# Patient Record
Sex: MALE | Race: ASIAN | NOT HISPANIC OR LATINO
[De-identification: names, ages, dates, MRNs, and addresses within clinical notes are randomized per-mention and may not be internally consistent; named-entity substitution may affect disease eponyms.]

---

## 2017-02-01 ENCOUNTER — APPOINTMENT (OUTPATIENT)
Dept: INTERNAL MEDICINE | Facility: HOSPITAL | Age: 62
End: 2017-02-01

## 2017-02-01 ENCOUNTER — MED ADMIN CHARGE (OUTPATIENT)
Age: 62
End: 2017-02-01

## 2017-02-01 ENCOUNTER — OUTPATIENT (OUTPATIENT)
Dept: OUTPATIENT SERVICES | Facility: HOSPITAL | Age: 62
LOS: 1 days | End: 2017-02-01

## 2017-02-01 VITALS — BODY MASS INDEX: 27.94 KG/M2 | WEIGHT: 178 LBS | HEIGHT: 67 IN

## 2017-02-01 VITALS — DIASTOLIC BLOOD PRESSURE: 69 MMHG | SYSTOLIC BLOOD PRESSURE: 108 MMHG | HEART RATE: 59 BPM

## 2017-02-01 DIAGNOSIS — Z87.898 PERSONAL HISTORY OF OTHER SPECIFIED CONDITIONS: ICD-10-CM

## 2017-02-01 DIAGNOSIS — R20.2 PARESTHESIA OF SKIN: ICD-10-CM

## 2017-02-01 DIAGNOSIS — B36.0 PITYRIASIS VERSICOLOR: ICD-10-CM

## 2017-02-01 DIAGNOSIS — N64.4 MASTODYNIA: ICD-10-CM

## 2017-02-01 DIAGNOSIS — M54.2 CERVICALGIA: ICD-10-CM

## 2017-02-01 DIAGNOSIS — Z95.1 PRESENCE OF AORTOCORONARY BYPASS GRAFT: Chronic | ICD-10-CM

## 2017-02-01 DIAGNOSIS — R21 RASH AND OTHER NONSPECIFIC SKIN ERUPTION: ICD-10-CM

## 2017-02-14 DIAGNOSIS — Z23 ENCOUNTER FOR IMMUNIZATION: ICD-10-CM

## 2017-02-21 ENCOUNTER — APPOINTMENT (OUTPATIENT)
Dept: PLASTIC SURGERY | Facility: HOSPITAL | Age: 62
End: 2017-02-21

## 2017-02-21 ENCOUNTER — OUTPATIENT (OUTPATIENT)
Dept: OUTPATIENT SERVICES | Facility: HOSPITAL | Age: 62
LOS: 1 days | End: 2017-02-21

## 2017-02-21 VITALS
BODY MASS INDEX: 28.41 KG/M2 | WEIGHT: 181 LBS | HEART RATE: 67 BPM | SYSTOLIC BLOOD PRESSURE: 109 MMHG | DIASTOLIC BLOOD PRESSURE: 60 MMHG | HEIGHT: 67 IN

## 2017-02-21 DIAGNOSIS — Z95.1 PRESENCE OF AORTOCORONARY BYPASS GRAFT: Chronic | ICD-10-CM

## 2017-02-22 DIAGNOSIS — G56.03 CARPAL TUNNEL SYNDROME, BILATERAL UPPER LIMBS: ICD-10-CM

## 2017-02-22 DIAGNOSIS — I73.9 PERIPHERAL VASCULAR DISEASE, UNSPECIFIED: ICD-10-CM

## 2017-02-28 ENCOUNTER — APPOINTMENT (OUTPATIENT)
Dept: NEUROLOGY | Facility: HOSPITAL | Age: 62
End: 2017-02-28

## 2017-03-08 ENCOUNTER — APPOINTMENT (OUTPATIENT)
Dept: CARDIOLOGY | Facility: HOSPITAL | Age: 62
End: 2017-03-08

## 2017-03-08 ENCOUNTER — OUTPATIENT (OUTPATIENT)
Dept: OUTPATIENT SERVICES | Facility: HOSPITAL | Age: 62
LOS: 1 days | End: 2017-03-08
Payer: MEDICAID

## 2017-03-08 VITALS
TEMPERATURE: 99 F | WEIGHT: 177.91 LBS | SYSTOLIC BLOOD PRESSURE: 120 MMHG | RESPIRATION RATE: 16 BRPM | HEART RATE: 74 BPM | HEIGHT: 67 IN | DIASTOLIC BLOOD PRESSURE: 80 MMHG

## 2017-03-08 VITALS
RESPIRATION RATE: 15 BRPM | OXYGEN SATURATION: 97 % | HEART RATE: 72 BPM | SYSTOLIC BLOOD PRESSURE: 112 MMHG | BODY MASS INDEX: 28.51 KG/M2 | DIASTOLIC BLOOD PRESSURE: 72 MMHG | WEIGHT: 182 LBS

## 2017-03-08 DIAGNOSIS — G56.00 CARPAL TUNNEL SYNDROME, UNSPECIFIED UPPER LIMB: ICD-10-CM

## 2017-03-08 DIAGNOSIS — Z95.1 PRESENCE OF AORTOCORONARY BYPASS GRAFT: Chronic | ICD-10-CM

## 2017-03-08 DIAGNOSIS — R07.9 CHEST PAIN, UNSPECIFIED: ICD-10-CM

## 2017-03-08 DIAGNOSIS — G56.02 CARPAL TUNNEL SYNDROME, LEFT UPPER LIMB: ICD-10-CM

## 2017-03-08 LAB
ALBUMIN SERPL ELPH-MCNC: 4.3 G/DL — SIGNIFICANT CHANGE UP (ref 3.3–5)
ALP SERPL-CCNC: 93 U/L — SIGNIFICANT CHANGE UP (ref 40–120)
ALT FLD-CCNC: 22 U/L — SIGNIFICANT CHANGE UP (ref 4–41)
AST SERPL-CCNC: 21 U/L — SIGNIFICANT CHANGE UP (ref 4–40)
BILIRUB SERPL-MCNC: 0.5 MG/DL — SIGNIFICANT CHANGE UP (ref 0.2–1.2)
BUN SERPL-MCNC: 15 MG/DL — SIGNIFICANT CHANGE UP (ref 7–23)
CALCIUM SERPL-MCNC: 9.1 MG/DL — SIGNIFICANT CHANGE UP (ref 8.4–10.5)
CHLORIDE SERPL-SCNC: 99 MMOL/L — SIGNIFICANT CHANGE UP (ref 98–107)
CO2 SERPL-SCNC: 24 MMOL/L — SIGNIFICANT CHANGE UP (ref 22–31)
CREAT SERPL-MCNC: 0.95 MG/DL — SIGNIFICANT CHANGE UP (ref 0.5–1.3)
GLUCOSE SERPL-MCNC: 117 MG/DL — HIGH (ref 70–99)
HCT VFR BLD CALC: 40.9 % — SIGNIFICANT CHANGE UP (ref 39–50)
HGB BLD-MCNC: 13.3 G/DL — SIGNIFICANT CHANGE UP (ref 13–17)
INR BLD: 1.11 — SIGNIFICANT CHANGE UP (ref 0.87–1.18)
MCHC RBC-ENTMCNC: 29 PG — SIGNIFICANT CHANGE UP (ref 27–34)
MCHC RBC-ENTMCNC: 32.5 % — SIGNIFICANT CHANGE UP (ref 32–36)
MCV RBC AUTO: 89.1 FL — SIGNIFICANT CHANGE UP (ref 80–100)
PLATELET # BLD AUTO: 187 K/UL — SIGNIFICANT CHANGE UP (ref 150–400)
PMV BLD: 12 FL — SIGNIFICANT CHANGE UP (ref 7–13)
POTASSIUM SERPL-MCNC: 3.7 MMOL/L — SIGNIFICANT CHANGE UP (ref 3.5–5.3)
POTASSIUM SERPL-SCNC: 3.7 MMOL/L — SIGNIFICANT CHANGE UP (ref 3.5–5.3)
PROT SERPL-MCNC: 7.4 G/DL — SIGNIFICANT CHANGE UP (ref 6–8.3)
PROTHROM AB SERPL-ACNC: 12.7 SEC — SIGNIFICANT CHANGE UP (ref 10–13.1)
RBC # BLD: 4.59 M/UL — SIGNIFICANT CHANGE UP (ref 4.2–5.8)
RBC # FLD: 12.9 % — SIGNIFICANT CHANGE UP (ref 10.3–14.5)
SODIUM SERPL-SCNC: 141 MMOL/L — SIGNIFICANT CHANGE UP (ref 135–145)
WBC # BLD: 7.29 K/UL — SIGNIFICANT CHANGE UP (ref 3.8–10.5)
WBC # FLD AUTO: 7.29 K/UL — SIGNIFICANT CHANGE UP (ref 3.8–10.5)

## 2017-03-08 PROCEDURE — 93010 ELECTROCARDIOGRAM REPORT: CPT

## 2017-03-08 RX ORDER — RANITIDINE HYDROCHLORIDE 150 MG/1
1 TABLET, FILM COATED ORAL
Qty: 0 | Refills: 0 | COMMUNITY

## 2017-03-08 NOTE — H&P PST ADULT - PROBLEM SELECTOR PLAN 2
h/o CAD, CABG in 1999, pt is on aspirin instructed to continue, pt is c/o left sided chest pain two wks ago, instructed to get cardiology clearance. Notified Dr Zamora's  office spoke to Sylvia.

## 2017-03-08 NOTE — H&P PST ADULT - NEGATIVE OPHTHALMOLOGIC SYMPTOMS
no lacrimation R/no blurred vision L/no photophobia/no blurred vision R/no lacrimation L/no diplopia

## 2017-03-08 NOTE — H&P PST ADULT - NSANTHOSAYNRD_GEN_A_CORE
No. HUGH screening performed.  STOP BANG Legend: 0-2 = LOW Risk; 3-4 = INTERMEDIATE Risk; 5-8 = HIGH Risk

## 2017-03-08 NOTE — H&P PST ADULT - PMH
CAD (coronary artery disease)    Essential hypertension    GERD (gastroesophageal reflux disease)    Hypercholesterolemia    Old MI (myocardial infarction)  1999  S/P CABG x 3  1999, [facility] CAD (coronary artery disease)    Chest pain    Essential hypertension    GERD (gastroesophageal reflux disease)    Hypercholesterolemia    Liver abscess  history of  Old MI (myocardial infarction)  1999  S/P CABG x 3  1999, [facility]

## 2017-03-08 NOTE — H&P PST ADULT - NEGATIVE MUSCULOSKELETAL SYMPTOMS
no muscle cramps/no arthralgia/no joint swelling/no muscle weakness/no arthritis/no myalgia no arthritis/no joint swelling/no muscle cramps/no muscle weakness/no myalgia

## 2017-03-08 NOTE — H&P PST ADULT - HISTORY OF PRESENT ILLNESS
62yr old male with h/o left wrist pain at night for yrs it 62yr old male with h/o left wrist pain at night for yrs, last two wks pain progressively got worse, presents to PST for pre surgical evaluation for left open carpal tunnel release on 3/13/17.

## 2017-03-09 DIAGNOSIS — E78.1 PURE HYPERGLYCERIDEMIA: ICD-10-CM

## 2017-03-21 ENCOUNTER — APPOINTMENT (OUTPATIENT)
Dept: CV DIAGNOSITCS | Facility: HOSPITAL | Age: 62
End: 2017-03-21

## 2017-03-21 ENCOUNTER — LABORATORY RESULT (OUTPATIENT)
Age: 62
End: 2017-03-21

## 2017-03-21 LAB
CHOLEST SERPL-MCNC: 156 MG/DL — SIGNIFICANT CHANGE UP (ref 120–199)
HDLC SERPL-MCNC: 40 MG/DL — SIGNIFICANT CHANGE UP (ref 35–55)
LIPID PNL WITH DIRECT LDL SERPL: 98 MG/DL — SIGNIFICANT CHANGE UP
TRIGL SERPL-MCNC: 200 MG/DL — HIGH (ref 10–149)
VIT B12 SERPL-MCNC: 359 PG/ML — SIGNIFICANT CHANGE UP (ref 200–900)

## 2017-03-21 PROCEDURE — 93925 LOWER EXTREMITY STUDY: CPT | Mod: 26

## 2017-03-28 ENCOUNTER — APPOINTMENT (OUTPATIENT)
Dept: CARDIOLOGY | Facility: HOSPITAL | Age: 62
End: 2017-03-28

## 2017-03-28 VITALS
WEIGHT: 182 LBS | OXYGEN SATURATION: 97 % | BODY MASS INDEX: 28.51 KG/M2 | DIASTOLIC BLOOD PRESSURE: 79 MMHG | RESPIRATION RATE: 16 BRPM | SYSTOLIC BLOOD PRESSURE: 160 MMHG | HEART RATE: 64 BPM

## 2017-03-28 VITALS — DIASTOLIC BLOOD PRESSURE: 78 MMHG | SYSTOLIC BLOOD PRESSURE: 140 MMHG

## 2017-04-12 ENCOUNTER — APPOINTMENT (OUTPATIENT)
Dept: INTERNAL MEDICINE | Facility: HOSPITAL | Age: 62
End: 2017-04-12

## 2017-04-27 ENCOUNTER — APPOINTMENT (OUTPATIENT)
Dept: INTERNAL MEDICINE | Facility: HOSPITAL | Age: 62
End: 2017-04-27

## 2017-05-17 ENCOUNTER — APPOINTMENT (OUTPATIENT)
Dept: INTERNAL MEDICINE | Facility: HOSPITAL | Age: 62
End: 2017-05-17

## 2017-06-20 ENCOUNTER — EMERGENCY (EMERGENCY)
Facility: HOSPITAL | Age: 62
LOS: 1 days | Discharge: HOME | End: 2017-06-20
Attending: EMERGENCY MEDICINE | Admitting: EMERGENCY MEDICINE
Payer: MEDICAID

## 2017-06-20 VITALS
SYSTOLIC BLOOD PRESSURE: 158 MMHG | RESPIRATION RATE: 18 BRPM | DIASTOLIC BLOOD PRESSURE: 72 MMHG | OXYGEN SATURATION: 100 % | HEART RATE: 97 BPM

## 2017-06-20 VITALS
OXYGEN SATURATION: 97 % | SYSTOLIC BLOOD PRESSURE: 154 MMHG | RESPIRATION RATE: 22 BRPM | TEMPERATURE: 98 F | DIASTOLIC BLOOD PRESSURE: 100 MMHG | HEART RATE: 81 BPM

## 2017-06-20 DIAGNOSIS — Z95.1 PRESENCE OF AORTOCORONARY BYPASS GRAFT: Chronic | ICD-10-CM

## 2017-06-20 LAB
ALBUMIN SERPL ELPH-MCNC: 4.4 G/DL — SIGNIFICANT CHANGE UP (ref 3.3–5)
ALP SERPL-CCNC: 87 U/L — SIGNIFICANT CHANGE UP (ref 40–120)
ALT FLD-CCNC: 17 U/L — SIGNIFICANT CHANGE UP (ref 4–41)
AST SERPL-CCNC: 22 U/L — SIGNIFICANT CHANGE UP (ref 4–40)
BASE EXCESS BLDV CALC-SCNC: 2.8 MMOL/L — SIGNIFICANT CHANGE UP
BASOPHILS # BLD AUTO: 0.01 K/UL — SIGNIFICANT CHANGE UP (ref 0–0.2)
BASOPHILS NFR BLD AUTO: 0.2 % — SIGNIFICANT CHANGE UP (ref 0–2)
BILIRUB SERPL-MCNC: 0.6 MG/DL — SIGNIFICANT CHANGE UP (ref 0.2–1.2)
BLOOD GAS VENOUS - CREATININE: 0.91 MG/DL — SIGNIFICANT CHANGE UP (ref 0.5–1.3)
BUN SERPL-MCNC: 15 MG/DL — SIGNIFICANT CHANGE UP (ref 7–23)
CALCIUM SERPL-MCNC: 9.2 MG/DL — SIGNIFICANT CHANGE UP (ref 8.4–10.5)
CHLORIDE BLDV-SCNC: 106 MMOL/L — SIGNIFICANT CHANGE UP (ref 96–108)
CHLORIDE SERPL-SCNC: 103 MMOL/L — SIGNIFICANT CHANGE UP (ref 98–107)
CO2 SERPL-SCNC: 24 MMOL/L — SIGNIFICANT CHANGE UP (ref 22–31)
CREAT SERPL-MCNC: 0.96 MG/DL — SIGNIFICANT CHANGE UP (ref 0.5–1.3)
EOSINOPHIL # BLD AUTO: 0.05 K/UL — SIGNIFICANT CHANGE UP (ref 0–0.5)
EOSINOPHIL NFR BLD AUTO: 1 % — SIGNIFICANT CHANGE UP (ref 0–6)
GAS PNL BLDV: 140 MMOL/L — SIGNIFICANT CHANGE UP (ref 136–146)
GLUCOSE BLDV-MCNC: 115 — HIGH (ref 70–99)
GLUCOSE SERPL-MCNC: 116 MG/DL — HIGH (ref 70–99)
HCO3 BLDV-SCNC: 25 MMOL/L — SIGNIFICANT CHANGE UP (ref 20–27)
HCT VFR BLD CALC: 41.1 % — SIGNIFICANT CHANGE UP (ref 39–50)
HCT VFR BLDV CALC: 42.1 % — SIGNIFICANT CHANGE UP (ref 39–51)
HGB BLD-MCNC: 13.4 G/DL — SIGNIFICANT CHANGE UP (ref 13–17)
HGB BLDV-MCNC: 13.7 G/DL — SIGNIFICANT CHANGE UP (ref 13–17)
IMM GRANULOCYTES NFR BLD AUTO: 0.6 % — SIGNIFICANT CHANGE UP (ref 0–1.5)
LACTATE BLDV-MCNC: 1.5 MMOL/L — SIGNIFICANT CHANGE UP (ref 0.5–2)
LYMPHOCYTES # BLD AUTO: 1.65 K/UL — SIGNIFICANT CHANGE UP (ref 1–3.3)
LYMPHOCYTES # BLD AUTO: 31.4 % — SIGNIFICANT CHANGE UP (ref 13–44)
MCHC RBC-ENTMCNC: 28.8 PG — SIGNIFICANT CHANGE UP (ref 27–34)
MCHC RBC-ENTMCNC: 32.6 % — SIGNIFICANT CHANGE UP (ref 32–36)
MCV RBC AUTO: 88.4 FL — SIGNIFICANT CHANGE UP (ref 80–100)
MONOCYTES # BLD AUTO: 0.43 K/UL — SIGNIFICANT CHANGE UP (ref 0–0.9)
MONOCYTES NFR BLD AUTO: 8.2 % — SIGNIFICANT CHANGE UP (ref 2–14)
NEUTROPHILS # BLD AUTO: 3.08 K/UL — SIGNIFICANT CHANGE UP (ref 1.8–7.4)
NEUTROPHILS NFR BLD AUTO: 58.6 % — SIGNIFICANT CHANGE UP (ref 43–77)
PCO2 BLDV: 49 MMHG — SIGNIFICANT CHANGE UP (ref 41–51)
PH BLDV: 7.37 PH — SIGNIFICANT CHANGE UP (ref 7.32–7.43)
PLATELET # BLD AUTO: 196 K/UL — SIGNIFICANT CHANGE UP (ref 150–400)
PMV BLD: 11.4 FL — SIGNIFICANT CHANGE UP (ref 7–13)
PO2 BLDV: 36 MMHG — SIGNIFICANT CHANGE UP (ref 35–40)
POTASSIUM BLDV-SCNC: 3.7 MMOL/L — SIGNIFICANT CHANGE UP (ref 3.4–4.5)
POTASSIUM SERPL-MCNC: 4 MMOL/L — SIGNIFICANT CHANGE UP (ref 3.5–5.3)
POTASSIUM SERPL-SCNC: 4 MMOL/L — SIGNIFICANT CHANGE UP (ref 3.5–5.3)
PROT SERPL-MCNC: 7.7 G/DL — SIGNIFICANT CHANGE UP (ref 6–8.3)
RBC # BLD: 4.65 M/UL — SIGNIFICANT CHANGE UP (ref 4.2–5.8)
RBC # FLD: 12.6 % — SIGNIFICANT CHANGE UP (ref 10.3–14.5)
SAO2 % BLDV: 62.8 % — SIGNIFICANT CHANGE UP (ref 60–85)
SODIUM SERPL-SCNC: 143 MMOL/L — SIGNIFICANT CHANGE UP (ref 135–145)
WBC # BLD: 5.25 K/UL — SIGNIFICANT CHANGE UP (ref 3.8–10.5)
WBC # FLD AUTO: 5.25 K/UL — SIGNIFICANT CHANGE UP (ref 3.8–10.5)

## 2017-06-20 PROCEDURE — 71020: CPT | Mod: 26

## 2017-06-20 PROCEDURE — 99285 EMERGENCY DEPT VISIT HI MDM: CPT

## 2017-06-20 RX ORDER — IPRATROPIUM/ALBUTEROL SULFATE 18-103MCG
3 AEROSOL WITH ADAPTER (GRAM) INHALATION ONCE
Qty: 0 | Refills: 0 | Status: COMPLETED | OUTPATIENT
Start: 2017-06-20 | End: 2017-06-20

## 2017-06-20 RX ORDER — ALBUTEROL 90 UG/1
1 AEROSOL, METERED ORAL ONCE
Qty: 0 | Refills: 0 | Status: COMPLETED | OUTPATIENT
Start: 2017-06-20 | End: 2017-06-20

## 2017-06-20 RX ORDER — FLUTICASONE PROPIONATE 220 MCG
2 AEROSOL WITH ADAPTER (GRAM) INHALATION ONCE
Qty: 0 | Refills: 0 | Status: COMPLETED | OUTPATIENT
Start: 2017-06-20 | End: 2017-06-20

## 2017-06-20 RX ORDER — DEXAMETHASONE 0.5 MG/5ML
6 ELIXIR ORAL ONCE
Qty: 0 | Refills: 0 | Status: COMPLETED | OUTPATIENT
Start: 2017-06-20 | End: 2017-06-20

## 2017-06-20 RX ORDER — FLUTICASONE PROPIONATE 220 MCG
2 AEROSOL WITH ADAPTER (GRAM) INHALATION ONCE
Qty: 0 | Refills: 0 | Status: DISCONTINUED | OUTPATIENT
Start: 2017-06-20 | End: 2017-06-20

## 2017-06-20 RX ADMIN — Medication 3 MILLILITER(S): at 20:41

## 2017-06-20 RX ADMIN — Medication 3 MILLILITER(S): at 21:09

## 2017-06-20 RX ADMIN — ALBUTEROL 1 PUFF(S): 90 AEROSOL, METERED ORAL at 22:21

## 2017-06-20 RX ADMIN — Medication 6 MILLIGRAM(S): at 23:06

## 2017-06-20 RX ADMIN — Medication 2 PUFF(S): at 23:28

## 2017-06-20 RX ADMIN — Medication 3 MILLILITER(S): at 21:57

## 2017-06-20 NOTE — ED ADULT NURSE NOTE - CHIEF COMPLAINT QUOTE
Pt arrived from [facility] at 1100h today.  C/O coughing x15 days and now has pain in back and chest with deep breath and vomiting x2.  C/O fever 101.  Took tylenol at noon.  Hx HTN-didn't take meds today.  Was treated in [facility] with Abx but symptoms continue.

## 2017-06-20 NOTE — ED ADULT NURSE NOTE - OBJECTIVE STATEMENT
Pt 62y male presents to ED c/o cough for 1month now. Pt pmh CAD, CABG, HTN, HLD. Pt AAOx4 and ambulatory, skin intact. Pt recently traveled to [facility] and recently returned. Pt states he has had cough with subjective fever for x8days now. Pt denies chills, N/V/D, chest pain, SOB, dysuria, hematuria. IV place 20G in his left AC, labs sent, will continue to monitor.

## 2017-06-20 NOTE — ED PROVIDER NOTE - ATTENDING CONTRIBUTION TO CARE
ED Attending Dr. King: 63 yo male with CAD s/p CABG, HTN, HLD, returned from [facility] today, reports 20 days of nonproductive cough that began on day 5-6 of travel.  No night sweats, hemoptysis or weight loss.  On exam pt well appearing, in NAD, coughing, heart RRR, lungs CTAB, abd NTND, extremities without swelling, strength 5/5 in all extremities and skin without rash.  Low suspicion for TB because pt denies associated systemic symptoms (sweats, weight loss, hemoptysis).

## 2017-06-20 NOTE — ED ADULT NURSE NOTE - CHPI ED SYMPTOMS NEG
no edema/no shortness of breath/no hemoptysis/no chills/no chest pain/no diaphoresis/no body aches/no headache

## 2017-06-20 NOTE — ED PROVIDER NOTE - PMH
CAD (coronary artery disease)    Chest pain    Essential hypertension    GERD (gastroesophageal reflux disease)    Hypercholesterolemia    Liver abscess  history of  Old MI (myocardial infarction)  1999  S/P CABG x 3  1999, [facility]

## 2017-06-20 NOTE — ED ADULT TRIAGE NOTE - CHIEF COMPLAINT QUOTE
Pt arrived from [facility] at 1100h today.  C/O coughing x15 days and now has pain in back and chest with deep breath and vomiting x2.  C/O fever 101.  Took tylenol at noon.  Hx HTN-didn't take meds today.  Was treated in [facility] with Abx but symptoms continue.

## 2017-06-20 NOTE — ED PROVIDER NOTE - OBJECTIVE STATEMENT
63 yo M with CAD s/p CABG, htn, hld, with history of cough x 1 month. Travel to [place] in May. Pt states the cough has been persistent and pt has had fevers 8 days ago but none since. Denies chills, rhinorrhea, otorrhea, otalgia, nausea, vomiting, constipation, diarrhea, chest pain, shortness of breath or changes in urinary habits.

## 2017-07-12 ENCOUNTER — EMERGENCY (EMERGENCY)
Facility: HOSPITAL | Age: 62
LOS: 1 days | Discharge: HOME | End: 2017-07-12
Admitting: EMERGENCY MEDICINE
Payer: MEDICAID

## 2017-07-12 VITALS
TEMPERATURE: 98 F | DIASTOLIC BLOOD PRESSURE: 78 MMHG | RESPIRATION RATE: 16 BRPM | HEART RATE: 73 BPM | SYSTOLIC BLOOD PRESSURE: 142 MMHG | OXYGEN SATURATION: 99 %

## 2017-07-12 DIAGNOSIS — Z95.1 PRESENCE OF AORTOCORONARY BYPASS GRAFT: Chronic | ICD-10-CM

## 2017-07-12 PROCEDURE — 99284 EMERGENCY DEPT VISIT MOD MDM: CPT

## 2017-07-12 RX ORDER — CIPROFLOXACIN AND DEXAMETHASONE 3; 1 MG/ML; MG/ML
4 SUSPENSION/ DROPS AURICULAR (OTIC)
Qty: 1 | Refills: 0 | OUTPATIENT
Start: 2017-07-12 | End: 2017-07-19

## 2017-07-12 NOTE — ED ADULT TRIAGE NOTE - CHIEF COMPLAINT QUOTE
pt. c/o left ear pain 4x days. Pt. states pain increases when touched and when pt. is swallowing. Pt. appears comfortable , denies any dizziness , n/v.

## 2017-07-12 NOTE — ED PROVIDER NOTE - PMH
CAD (coronary artery disease)    Chest pain    Essential hypertension    GERD (gastroesophageal reflux disease)    Hypercholesterolemia    Liver abscess  history of  Old MI (myocardial infarction)  1999  S/P CABG x 3  1999, [facility]

## 2017-07-12 NOTE — ED PROVIDER NOTE - OBJECTIVE STATEMENT
62 year old male with a PMHx of HTN and triple coronary bypass pw left sided ear pain when touching outside ear and swallowing for 4 days. Patient states that he had a cold 1 month ago but those symptoms have resolved. Admits that 1 week ago the patient put his car key into his ear because of itching but only dry wax came out. Denies n/v/f/c, change in vision, dizziness, ear drainage/discharge/bleeding, tinnitus, nasal congestion, sore throat, cough, CP, SOB, abdominal pain, urinary symptoms.

## 2017-07-12 NOTE — ED PROVIDER NOTE - PLAN OF CARE
Apply 4 drops of Ciprodex Antibiotic into the affected ear twice daily. Follow up with your primary care physician/ENT within 1 week for further evaluation. Return to the Emergency Department for any new, worsening or concerning symptoms.

## 2017-07-12 NOTE — ED PROVIDER NOTE - CHPI ED SYMPTOMS NEG
no syncope/no chills/no change in level of consciousness/no fever/no blurred vision/no vomiting/no loss of consciousness/no numbness/no weakness/no nausea

## 2017-07-12 NOTE — ED PROVIDER NOTE - MEDICAL DECISION MAKING DETAILS
62 year old male with a PMHx of HTN and triple coronary bypass pw left sided ear pain when touching outside ear and swallowing for 4 days.   Plan: abx

## 2017-07-12 NOTE — ED PROVIDER NOTE - CARE PLAN
Principal Discharge DX:	Otitis externa  Instructions for follow-up, activity and diet:	Apply 4 drops of Ciprodex Antibiotic into the affected ear twice daily. Follow up with your primary care physician/ENT within 1 week for further evaluation. Return to the Emergency Department for any new, worsening or concerning symptoms.

## 2017-07-26 ENCOUNTER — OUTPATIENT (OUTPATIENT)
Dept: OUTPATIENT SERVICES | Facility: HOSPITAL | Age: 62
LOS: 1 days | End: 2017-07-26

## 2017-07-26 ENCOUNTER — APPOINTMENT (OUTPATIENT)
Dept: INTERNAL MEDICINE | Facility: HOSPITAL | Age: 62
End: 2017-07-26

## 2017-07-26 VITALS — HEIGHT: 67 IN | WEIGHT: 180 LBS | BODY MASS INDEX: 28.25 KG/M2

## 2017-07-26 VITALS — DIASTOLIC BLOOD PRESSURE: 84 MMHG | SYSTOLIC BLOOD PRESSURE: 124 MMHG

## 2017-07-26 DIAGNOSIS — Z87.891 PERSONAL HISTORY OF NICOTINE DEPENDENCE: ICD-10-CM

## 2017-07-26 DIAGNOSIS — H60.312 DIFFUSE OTITIS EXTERNA, LEFT EAR: ICD-10-CM

## 2017-07-26 DIAGNOSIS — Z95.1 PRESENCE OF AORTOCORONARY BYPASS GRAFT: Chronic | ICD-10-CM

## 2017-07-26 DIAGNOSIS — Z87.898 PERSONAL HISTORY OF OTHER SPECIFIED CONDITIONS: ICD-10-CM

## 2017-07-26 DIAGNOSIS — R20.2 PARESTHESIA OF SKIN: ICD-10-CM

## 2017-07-26 DIAGNOSIS — I73.9 PERIPHERAL VASCULAR DISEASE, UNSPECIFIED: ICD-10-CM

## 2017-07-27 DIAGNOSIS — R53.83 OTHER FATIGUE: ICD-10-CM

## 2017-07-27 DIAGNOSIS — Z87.898 PERSONAL HISTORY OF OTHER SPECIFIED CONDITIONS: ICD-10-CM

## 2017-07-27 DIAGNOSIS — I10 ESSENTIAL (PRIMARY) HYPERTENSION: ICD-10-CM

## 2017-07-27 DIAGNOSIS — G56.03 CARPAL TUNNEL SYNDROME, BILATERAL UPPER LIMBS: ICD-10-CM

## 2017-07-27 DIAGNOSIS — I73.9 PERIPHERAL VASCULAR DISEASE, UNSPECIFIED: ICD-10-CM

## 2017-07-27 DIAGNOSIS — E78.5 HYPERLIPIDEMIA, UNSPECIFIED: ICD-10-CM

## 2017-07-27 DIAGNOSIS — H60.312 DIFFUSE OTITIS EXTERNA, LEFT EAR: ICD-10-CM

## 2017-07-27 DIAGNOSIS — I25.10 ATHEROSCLEROTIC HEART DISEASE OF NATIVE CORONARY ARTERY WITHOUT ANGINA PECTORIS: ICD-10-CM

## 2017-07-27 DIAGNOSIS — E78.1 PURE HYPERGLYCERIDEMIA: ICD-10-CM

## 2017-08-22 ENCOUNTER — NON-APPOINTMENT (OUTPATIENT)
Age: 62
End: 2017-08-22

## 2017-08-22 ENCOUNTER — OUTPATIENT (OUTPATIENT)
Dept: OUTPATIENT SERVICES | Facility: HOSPITAL | Age: 62
LOS: 1 days | End: 2017-08-22

## 2017-08-22 ENCOUNTER — APPOINTMENT (OUTPATIENT)
Dept: CARDIOLOGY | Facility: HOSPITAL | Age: 62
End: 2017-08-22

## 2017-08-22 VITALS
OXYGEN SATURATION: 98 % | DIASTOLIC BLOOD PRESSURE: 86 MMHG | RESPIRATION RATE: 15 BRPM | SYSTOLIC BLOOD PRESSURE: 147 MMHG | WEIGHT: 175 LBS | BODY MASS INDEX: 27.41 KG/M2 | HEART RATE: 57 BPM

## 2017-08-22 DIAGNOSIS — Z95.1 PRESENCE OF AORTOCORONARY BYPASS GRAFT: Chronic | ICD-10-CM

## 2017-08-24 DIAGNOSIS — I10 ESSENTIAL (PRIMARY) HYPERTENSION: ICD-10-CM

## 2017-08-30 ENCOUNTER — APPOINTMENT (OUTPATIENT)
Dept: INTERNAL MEDICINE | Facility: HOSPITAL | Age: 62
End: 2017-08-30

## 2017-10-02 ENCOUNTER — OTHER (OUTPATIENT)
Age: 62
End: 2017-10-02

## 2017-10-02 ENCOUNTER — OUTPATIENT (OUTPATIENT)
Dept: OUTPATIENT SERVICES | Facility: HOSPITAL | Age: 62
LOS: 1 days | End: 2017-10-02

## 2017-10-02 ENCOUNTER — MED ADMIN CHARGE (OUTPATIENT)
Age: 62
End: 2017-10-02

## 2017-10-02 ENCOUNTER — APPOINTMENT (OUTPATIENT)
Dept: INTERNAL MEDICINE | Facility: HOSPITAL | Age: 62
End: 2017-10-02
Payer: SELF-PAY

## 2017-10-02 VITALS — HEART RATE: 64 BPM | DIASTOLIC BLOOD PRESSURE: 80 MMHG | SYSTOLIC BLOOD PRESSURE: 155 MMHG

## 2017-10-02 DIAGNOSIS — Z87.898 PERSONAL HISTORY OF OTHER SPECIFIED CONDITIONS: ICD-10-CM

## 2017-10-02 DIAGNOSIS — Z95.1 PRESENCE OF AORTOCORONARY BYPASS GRAFT: Chronic | ICD-10-CM

## 2017-10-02 DIAGNOSIS — Z00.00 ENCOUNTER FOR GENERAL ADULT MEDICAL EXAMINATION WITHOUT ABNORMAL FINDINGS: ICD-10-CM

## 2017-10-02 PROCEDURE — 99213 OFFICE O/P EST LOW 20 MIN: CPT | Mod: GE

## 2017-10-02 RX ORDER — CIPROFLOXACIN AND DEXAMETHASONE 3; 1 MG/ML; MG/ML
0.3-0.1 SUSPENSION/ DROPS AURICULAR (OTIC)
Refills: 0 | Status: COMPLETED | COMMUNITY
Start: 2017-07-18 | End: 2017-10-02

## 2017-10-04 DIAGNOSIS — I10 ESSENTIAL (PRIMARY) HYPERTENSION: ICD-10-CM

## 2017-10-04 DIAGNOSIS — G62.9 POLYNEUROPATHY, UNSPECIFIED: ICD-10-CM

## 2017-10-04 DIAGNOSIS — G56.03 CARPAL TUNNEL SYNDROME, BILATERAL UPPER LIMBS: ICD-10-CM

## 2017-10-04 DIAGNOSIS — H91.92 UNSPECIFIED HEARING LOSS, LEFT EAR: ICD-10-CM

## 2017-11-06 ENCOUNTER — OUTPATIENT (OUTPATIENT)
Dept: OUTPATIENT SERVICES | Facility: HOSPITAL | Age: 62
LOS: 1 days | End: 2017-11-06

## 2017-11-06 ENCOUNTER — LABORATORY RESULT (OUTPATIENT)
Age: 62
End: 2017-11-06

## 2017-11-06 ENCOUNTER — APPOINTMENT (OUTPATIENT)
Dept: INTERNAL MEDICINE | Facility: HOSPITAL | Age: 62
End: 2017-11-06
Payer: SELF-PAY

## 2017-11-06 VITALS — BODY MASS INDEX: 28.72 KG/M2 | HEIGHT: 67 IN | WEIGHT: 183 LBS

## 2017-11-06 VITALS — HEART RATE: 64 BPM | SYSTOLIC BLOOD PRESSURE: 124 MMHG | DIASTOLIC BLOOD PRESSURE: 78 MMHG

## 2017-11-06 DIAGNOSIS — H91.92 UNSPECIFIED HEARING LOSS, LEFT EAR: ICD-10-CM

## 2017-11-06 DIAGNOSIS — Z95.1 PRESENCE OF AORTOCORONARY BYPASS GRAFT: Chronic | ICD-10-CM

## 2017-11-06 LAB
BUN SERPL-MCNC: 14 MG/DL — SIGNIFICANT CHANGE UP (ref 7–23)
CALCIUM SERPL-MCNC: 8.8 MG/DL — SIGNIFICANT CHANGE UP (ref 8.4–10.5)
CHLORIDE SERPL-SCNC: 103 MMOL/L — SIGNIFICANT CHANGE UP (ref 98–107)
CO2 SERPL-SCNC: 31 MMOL/L — SIGNIFICANT CHANGE UP (ref 22–31)
CREAT SERPL-MCNC: 1.13 MG/DL — SIGNIFICANT CHANGE UP (ref 0.5–1.3)
GLUCOSE SERPL-MCNC: 98 MG/DL — SIGNIFICANT CHANGE UP (ref 70–99)
HBA1C BLD-MCNC: 6.5 % — HIGH (ref 4–5.6)
POTASSIUM SERPL-MCNC: 3.9 MMOL/L — SIGNIFICANT CHANGE UP (ref 3.5–5.3)
POTASSIUM SERPL-SCNC: 3.9 MMOL/L — SIGNIFICANT CHANGE UP (ref 3.5–5.3)
SODIUM SERPL-SCNC: 144 MMOL/L — SIGNIFICANT CHANGE UP (ref 135–145)

## 2017-11-06 PROCEDURE — 99214 OFFICE O/P EST MOD 30 MIN: CPT | Mod: GC

## 2017-11-06 RX ORDER — PREDNISONE 20 MG/1
20 TABLET ORAL
Qty: 14 | Refills: 0 | Status: DISCONTINUED | COMMUNITY
Start: 2017-10-02 | End: 2017-11-06

## 2017-11-14 DIAGNOSIS — I25.10 ATHEROSCLEROTIC HEART DISEASE OF NATIVE CORONARY ARTERY WITHOUT ANGINA PECTORIS: ICD-10-CM

## 2017-11-14 DIAGNOSIS — R73.09 OTHER ABNORMAL GLUCOSE: ICD-10-CM

## 2017-11-14 DIAGNOSIS — J32.9 CHRONIC SINUSITIS, UNSPECIFIED: ICD-10-CM

## 2017-11-14 DIAGNOSIS — E78.5 HYPERLIPIDEMIA, UNSPECIFIED: ICD-10-CM

## 2017-11-14 DIAGNOSIS — G62.9 POLYNEUROPATHY, UNSPECIFIED: ICD-10-CM

## 2017-11-14 DIAGNOSIS — H91.92 UNSPECIFIED HEARING LOSS, LEFT EAR: ICD-10-CM

## 2017-11-14 DIAGNOSIS — I10 ESSENTIAL (PRIMARY) HYPERTENSION: ICD-10-CM

## 2017-11-16 ENCOUNTER — APPOINTMENT (OUTPATIENT)
Dept: CARDIOLOGY | Facility: HOSPITAL | Age: 62
End: 2017-11-16

## 2017-11-17 LAB — HEMOCCULT STL QL IA: NEGATIVE

## 2017-11-28 ENCOUNTER — APPOINTMENT (OUTPATIENT)
Dept: CARDIOLOGY | Facility: HOSPITAL | Age: 62
End: 2017-11-28

## 2017-11-30 ENCOUNTER — APPOINTMENT (OUTPATIENT)
Dept: CARDIOLOGY | Facility: HOSPITAL | Age: 62
End: 2017-11-30

## 2017-12-14 ENCOUNTER — OUTPATIENT (OUTPATIENT)
Dept: OUTPATIENT SERVICES | Facility: HOSPITAL | Age: 62
LOS: 1 days | End: 2017-12-14

## 2017-12-14 ENCOUNTER — APPOINTMENT (OUTPATIENT)
Dept: CARDIOLOGY | Facility: HOSPITAL | Age: 62
End: 2017-12-14

## 2017-12-14 VITALS — DIASTOLIC BLOOD PRESSURE: 76 MMHG | SYSTOLIC BLOOD PRESSURE: 121 MMHG

## 2017-12-14 VITALS
WEIGHT: 185 LBS | SYSTOLIC BLOOD PRESSURE: 147 MMHG | OXYGEN SATURATION: 98 % | RESPIRATION RATE: 14 BRPM | BODY MASS INDEX: 28.98 KG/M2 | HEART RATE: 70 BPM | DIASTOLIC BLOOD PRESSURE: 82 MMHG

## 2017-12-14 DIAGNOSIS — Z95.1 PRESENCE OF AORTOCORONARY BYPASS GRAFT: Chronic | ICD-10-CM

## 2017-12-15 DIAGNOSIS — I10 ESSENTIAL (PRIMARY) HYPERTENSION: ICD-10-CM

## 2018-01-07 ENCOUNTER — EMERGENCY (EMERGENCY)
Facility: HOSPITAL | Age: 63
LOS: 1 days | Discharge: HOME | End: 2018-01-07
Attending: EMERGENCY MEDICINE | Admitting: EMERGENCY MEDICINE
Payer: MEDICARE

## 2018-01-07 VITALS
OXYGEN SATURATION: 100 % | HEART RATE: 72 BPM | TEMPERATURE: 98 F | RESPIRATION RATE: 18 BRPM | DIASTOLIC BLOOD PRESSURE: 77 MMHG | SYSTOLIC BLOOD PRESSURE: 133 MMHG

## 2018-01-07 DIAGNOSIS — Z95.1 PRESENCE OF AORTOCORONARY BYPASS GRAFT: Chronic | ICD-10-CM

## 2018-01-07 PROCEDURE — 99283 EMERGENCY DEPT VISIT LOW MDM: CPT | Mod: GC

## 2018-01-07 RX ORDER — VALACYCLOVIR 500 MG/1
1000 TABLET, FILM COATED ORAL ONCE
Qty: 0 | Refills: 0 | Status: COMPLETED | OUTPATIENT
Start: 2018-01-07 | End: 2018-01-07

## 2018-01-07 RX ORDER — VALACYCLOVIR 500 MG/1
1 TABLET, FILM COATED ORAL
Qty: 21 | Refills: 0 | OUTPATIENT
Start: 2018-01-07 | End: 2018-01-13

## 2018-01-07 RX ADMIN — Medication 450 MILLIGRAM(S): at 11:42

## 2018-01-07 RX ADMIN — VALACYCLOVIR 1000 MILLIGRAM(S): 500 TABLET, FILM COATED ORAL at 11:42

## 2018-01-07 NOTE — ED PROVIDER NOTE - PMH
CAD (coronary artery disease)    Chest pain    Essential hypertension    GERD (gastroesophageal reflux disease)    Hypercholesterolemia    Liver abscess  history of  Old MI (myocardial infarction)  1999  S/P CABG x 3  1999, [facility]

## 2018-01-07 NOTE — ED PROVIDER NOTE - SKIN RASH DESCRIPTION
one vesicle at tip of L nares with surrounding errythema and warmth. Second vescicle at lateral aspect of R eyelid with surrounding errythema and warmth. Third vescicle at  dorsal aspect of L hand at base of 5th digit. one vesicle at tip of L nares with surrounding erythema and warmth. Second vesicle at lateral aspect of R eyelid with surrounding errythema and warmth. Third vescicle at  dorsal aspect of L hand at base of 5th digit.

## 2018-01-07 NOTE — ED PROVIDER NOTE - EYE EXAM METHOD
1 vesicular lesion over lateral upper eyelid with surrounding erythema fluorescein/1 vesicular lesion over lateral upper eyelid with surrounding erythema/slit lamp

## 2018-01-07 NOTE — ED ADULT TRIAGE NOTE - CHIEF COMPLAINT QUOTE
Co swelling to right upper eyelid and redness to right eye x 2 days. Also has red bump to left fifth digit x 3 days. Scab with redness to surrounding area to left side of nose x 2 days. States scab sometimes bleeds. Co subjective fevers. Co swelling to right upper eyelid and redness to right eye x 2 days. Also has small red bump to left fifth digit x 3 days. Scab with redness to surrounding area to left side of nose x 2 days. States scab sometimes bleeds. Co subjective fevers.

## 2018-01-07 NOTE — ED PROVIDER NOTE - ATTENDING CONTRIBUTION TO CARE
Dr. Bruce: I have personally performed a face to face bedside history and physical examination of this patient. I have discussed the history, examination, review of systems, assessment and plan of management with the resident. I have reviewed the electronic medical record and amended it to reflect my history, review of systems, physical exam, assessment and plan.

## 2018-01-07 NOTE — ED PROVIDER NOTE - OBJECTIVE STATEMENT
Pt is a 63 y/o M with HTN and CAD presents to the ED with red bumps x4 days on his L side nose, L hand and R eye. Last night he had a fever and HA. He notes that his R eye vision is impaired, but L eye vision has had no changes. 1 month ago he had nose bleeding and was given Augmentin and Ibuprofen. He is unsure if he had gotten the shingles vaccine, but he notes that he was borderline diabetic at his last PMD visit. Pt is a 61 y/o M with HTN and CAD presents to the ED with red bumps x4 days on his L side nose, L hand and R eye.  He notes that his R eye vision is impaired, but L eye vision has had no changes. 1 month ago he had nose bleeding with similar sx and was given Augmentin and Ibuprofen. He is unsure if he had gotten the shingles vaccine, but he notes that he was borderline diabetic at his last PMD visit.

## 2018-01-07 NOTE — ED PROVIDER NOTE - PROGRESS NOTE DETAILS
Dr. Ben Masters PGY1: fluorescein slit lamp performed that showed normal diffuse uptake in bilat eyes, normal slit lamp; valtrex and clinda prescribed, ok for dc

## 2018-01-12 ENCOUNTER — OUTPATIENT (OUTPATIENT)
Dept: OUTPATIENT SERVICES | Facility: HOSPITAL | Age: 63
LOS: 1 days | End: 2018-01-12

## 2018-01-12 ENCOUNTER — APPOINTMENT (OUTPATIENT)
Dept: INTERNAL MEDICINE | Facility: HOSPITAL | Age: 63
End: 2018-01-12
Payer: MEDICARE

## 2018-01-12 VITALS — HEIGHT: 67 IN | WEIGHT: 185.19 LBS | BODY MASS INDEX: 29.07 KG/M2

## 2018-01-12 VITALS — SYSTOLIC BLOOD PRESSURE: 116 MMHG | HEART RATE: 64 BPM | DIASTOLIC BLOOD PRESSURE: 80 MMHG

## 2018-01-12 DIAGNOSIS — Z95.1 PRESENCE OF AORTOCORONARY BYPASS GRAFT: Chronic | ICD-10-CM

## 2018-01-12 DIAGNOSIS — B02.9 ZOSTER W/OUT COMPLICATIONS: ICD-10-CM

## 2018-01-12 PROCEDURE — 99214 OFFICE O/P EST MOD 30 MIN: CPT | Mod: GC

## 2018-01-16 DIAGNOSIS — I25.10 ATHEROSCLEROTIC HEART DISEASE OF NATIVE CORONARY ARTERY WITHOUT ANGINA PECTORIS: ICD-10-CM

## 2018-01-16 DIAGNOSIS — B02.9 ZOSTER WITHOUT COMPLICATIONS: ICD-10-CM

## 2018-01-16 DIAGNOSIS — L03.012 CELLULITIS OF LEFT FINGER: ICD-10-CM

## 2018-01-18 ENCOUNTER — APPOINTMENT (OUTPATIENT)
Dept: CARDIOLOGY | Facility: HOSPITAL | Age: 63
End: 2018-01-18

## 2018-02-15 ENCOUNTER — OUTPATIENT (OUTPATIENT)
Dept: OUTPATIENT SERVICES | Facility: HOSPITAL | Age: 63
LOS: 1 days | End: 2018-02-15

## 2018-02-15 ENCOUNTER — APPOINTMENT (OUTPATIENT)
Dept: CARDIOLOGY | Facility: HOSPITAL | Age: 63
End: 2018-02-15

## 2018-02-15 VITALS — DIASTOLIC BLOOD PRESSURE: 75 MMHG | SYSTOLIC BLOOD PRESSURE: 126 MMHG

## 2018-02-15 VITALS
HEART RATE: 64 BPM | RESPIRATION RATE: 16 BRPM | OXYGEN SATURATION: 98 % | WEIGHT: 184 LBS | SYSTOLIC BLOOD PRESSURE: 149 MMHG | DIASTOLIC BLOOD PRESSURE: 83 MMHG | BODY MASS INDEX: 28.82 KG/M2

## 2018-02-15 DIAGNOSIS — E78.1 PURE HYPERGLYCERIDEMIA: ICD-10-CM

## 2018-02-15 DIAGNOSIS — Z95.1 PRESENCE OF AORTOCORONARY BYPASS GRAFT: Chronic | ICD-10-CM

## 2018-02-21 ENCOUNTER — APPOINTMENT (OUTPATIENT)
Dept: INTERNAL MEDICINE | Facility: HOSPITAL | Age: 63
End: 2018-02-21

## 2018-03-09 DIAGNOSIS — I25.10 ATHEROSCLEROTIC HEART DISEASE OF NATIVE CORONARY ARTERY WITHOUT ANGINA PECTORIS: ICD-10-CM

## 2018-04-30 ENCOUNTER — OUTPATIENT (OUTPATIENT)
Dept: OUTPATIENT SERVICES | Facility: HOSPITAL | Age: 63
LOS: 1 days | End: 2018-04-30

## 2018-04-30 ENCOUNTER — APPOINTMENT (OUTPATIENT)
Dept: INTERNAL MEDICINE | Facility: HOSPITAL | Age: 63
End: 2018-04-30
Payer: MEDICARE

## 2018-04-30 ENCOUNTER — LABORATORY RESULT (OUTPATIENT)
Age: 63
End: 2018-04-30

## 2018-04-30 VITALS — WEIGHT: 185 LBS | BODY MASS INDEX: 29.03 KG/M2 | HEIGHT: 67 IN

## 2018-04-30 VITALS — SYSTOLIC BLOOD PRESSURE: 120 MMHG | DIASTOLIC BLOOD PRESSURE: 60 MMHG

## 2018-04-30 DIAGNOSIS — N52.9 MALE ERECTILE DYSFUNCTION, UNSPECIFIED: ICD-10-CM

## 2018-04-30 DIAGNOSIS — G56.03 CARPAL TUNNEL SYNDROM,BILATERAL UPPER LIMBS: ICD-10-CM

## 2018-04-30 DIAGNOSIS — I49.3 VENTRICULAR PREMATURE DEPOLARIZATION: ICD-10-CM

## 2018-04-30 DIAGNOSIS — Z95.1 PRESENCE OF AORTOCORONARY BYPASS GRAFT: Chronic | ICD-10-CM

## 2018-04-30 LAB
BUN SERPL-MCNC: 13 MG/DL — SIGNIFICANT CHANGE UP (ref 7–23)
CALCIUM SERPL-MCNC: 9.5 MG/DL — SIGNIFICANT CHANGE UP (ref 8.4–10.5)
CHLORIDE SERPL-SCNC: 101 MMOL/L — SIGNIFICANT CHANGE UP (ref 98–107)
CO2 SERPL-SCNC: 23 MMOL/L — SIGNIFICANT CHANGE UP (ref 22–31)
CREAT SERPL-MCNC: 1.18 MG/DL — SIGNIFICANT CHANGE UP (ref 0.5–1.3)
GLUCOSE SERPL-MCNC: 118 MG/DL — HIGH (ref 70–99)
POTASSIUM SERPL-MCNC: 4.3 MMOL/L — SIGNIFICANT CHANGE UP (ref 3.5–5.3)
POTASSIUM SERPL-SCNC: 4.3 MMOL/L — SIGNIFICANT CHANGE UP (ref 3.5–5.3)
SODIUM SERPL-SCNC: 139 MMOL/L — SIGNIFICANT CHANGE UP (ref 135–145)

## 2018-04-30 PROCEDURE — 99213 OFFICE O/P EST LOW 20 MIN: CPT | Mod: GE

## 2018-05-14 DIAGNOSIS — I25.10 ATHEROSCLEROTIC HEART DISEASE OF NATIVE CORONARY ARTERY WITHOUT ANGINA PECTORIS: ICD-10-CM

## 2018-05-14 DIAGNOSIS — M25.50 PAIN IN UNSPECIFIED JOINT: ICD-10-CM

## 2018-05-14 DIAGNOSIS — G56.03 CARPAL TUNNEL SYNDROME, BILATERAL UPPER LIMBS: ICD-10-CM

## 2018-05-14 DIAGNOSIS — N52.9 MALE ERECTILE DYSFUNCTION, UNSPECIFIED: ICD-10-CM

## 2018-05-14 DIAGNOSIS — I49.3 VENTRICULAR PREMATURE DEPOLARIZATION: ICD-10-CM

## 2018-08-16 ENCOUNTER — APPOINTMENT (OUTPATIENT)
Dept: CARDIOLOGY | Facility: HOSPITAL | Age: 63
End: 2018-08-16

## 2018-08-16 ENCOUNTER — NON-APPOINTMENT (OUTPATIENT)
Age: 63
End: 2018-08-16

## 2018-08-16 ENCOUNTER — OUTPATIENT (OUTPATIENT)
Dept: OUTPATIENT SERVICES | Facility: HOSPITAL | Age: 63
LOS: 1 days | End: 2018-08-16

## 2018-08-16 VITALS
HEIGHT: 67 IN | RESPIRATION RATE: 16 BRPM | OXYGEN SATURATION: 98 % | WEIGHT: 185 LBS | HEART RATE: 60 BPM | DIASTOLIC BLOOD PRESSURE: 64 MMHG | SYSTOLIC BLOOD PRESSURE: 110 MMHG | BODY MASS INDEX: 29.03 KG/M2

## 2018-08-16 DIAGNOSIS — Z95.1 PRESENCE OF AORTOCORONARY BYPASS GRAFT: Chronic | ICD-10-CM

## 2018-08-16 PROBLEM — K21.9 GASTRO-ESOPHAGEAL REFLUX DISEASE WITHOUT ESOPHAGITIS: Chronic | Status: ACTIVE | Noted: 2017-03-08

## 2018-08-16 PROBLEM — R07.9 CHEST PAIN, UNSPECIFIED: Chronic | Status: ACTIVE | Noted: 2017-03-08

## 2018-08-16 RX ORDER — SILDENAFIL 25 MG/1
25 TABLET ORAL
Qty: 60 | Refills: 0 | Status: DISCONTINUED | COMMUNITY
Start: 2018-04-30 | End: 2018-08-16

## 2018-08-23 ENCOUNTER — APPOINTMENT (OUTPATIENT)
Dept: DERMATOLOGY | Facility: CLINIC | Age: 63
End: 2018-08-23
Payer: MEDICARE

## 2018-08-23 VITALS
SYSTOLIC BLOOD PRESSURE: 120 MMHG | WEIGHT: 175 LBS | DIASTOLIC BLOOD PRESSURE: 70 MMHG | BODY MASS INDEX: 27.47 KG/M2 | HEIGHT: 67 IN

## 2018-08-23 DIAGNOSIS — B36.0 PITYRIASIS VERSICOLOR: ICD-10-CM

## 2018-08-23 PROCEDURE — 99202 OFFICE O/P NEW SF 15 MIN: CPT

## 2018-08-28 ENCOUNTER — INPATIENT (INPATIENT)
Facility: HOSPITAL | Age: 63
LOS: 0 days | Discharge: HOME | End: 2018-08-29
Attending: INTERNAL MEDICINE | Admitting: INTERNAL MEDICINE
Payer: MEDICARE

## 2018-08-28 VITALS
DIASTOLIC BLOOD PRESSURE: 89 MMHG | HEART RATE: 66 BPM | SYSTOLIC BLOOD PRESSURE: 144 MMHG | TEMPERATURE: 98 F | HEIGHT: 67 IN | OXYGEN SATURATION: 98 % | WEIGHT: 181 LBS | RESPIRATION RATE: 18 BRPM

## 2018-08-28 DIAGNOSIS — I25.9 CHRONIC ISCHEMIC HEART DISEASE, UNSPECIFIED: ICD-10-CM

## 2018-08-28 DIAGNOSIS — Z95.1 PRESENCE OF AORTOCORONARY BYPASS GRAFT: Chronic | ICD-10-CM

## 2018-08-28 LAB
BUN SERPL-MCNC: 10 MG/DL — SIGNIFICANT CHANGE UP (ref 7–23)
CALCIUM SERPL-MCNC: 8.9 MG/DL — SIGNIFICANT CHANGE UP (ref 8.4–10.5)
CHLORIDE SERPL-SCNC: 104 MMOL/L — SIGNIFICANT CHANGE UP (ref 98–107)
CO2 SERPL-SCNC: 26 MMOL/L — SIGNIFICANT CHANGE UP (ref 22–31)
CREAT SERPL-MCNC: 1.09 MG/DL — SIGNIFICANT CHANGE UP (ref 0.5–1.3)
GLUCOSE SERPL-MCNC: 91 MG/DL — SIGNIFICANT CHANGE UP (ref 70–99)
HBA1C BLD-MCNC: 6 % — HIGH (ref 4–5.6)
HCT VFR BLD CALC: 37.4 % — LOW (ref 39–50)
HGB BLD-MCNC: 12.3 G/DL — LOW (ref 13–17)
MCHC RBC-ENTMCNC: 29.5 PG — SIGNIFICANT CHANGE UP (ref 27–34)
MCHC RBC-ENTMCNC: 32.9 % — SIGNIFICANT CHANGE UP (ref 32–36)
MCV RBC AUTO: 89.7 FL — SIGNIFICANT CHANGE UP (ref 80–100)
NRBC # FLD: 0 — SIGNIFICANT CHANGE UP
PLATELET # BLD AUTO: 147 K/UL — LOW (ref 150–400)
PMV BLD: 12.1 FL — SIGNIFICANT CHANGE UP (ref 7–13)
POTASSIUM SERPL-MCNC: 3.9 MMOL/L — SIGNIFICANT CHANGE UP (ref 3.5–5.3)
POTASSIUM SERPL-SCNC: 3.9 MMOL/L — SIGNIFICANT CHANGE UP (ref 3.5–5.3)
RBC # BLD: 4.17 M/UL — LOW (ref 4.2–5.8)
RBC # FLD: 12.7 % — SIGNIFICANT CHANGE UP (ref 10.3–14.5)
SODIUM SERPL-SCNC: 141 MMOL/L — SIGNIFICANT CHANGE UP (ref 135–145)
WBC # BLD: 6.68 K/UL — SIGNIFICANT CHANGE UP (ref 3.8–10.5)
WBC # FLD AUTO: 6.68 K/UL — SIGNIFICANT CHANGE UP (ref 3.8–10.5)

## 2018-08-28 PROCEDURE — 93459 L HRT ART/GRFT ANGIO: CPT | Mod: 26,59

## 2018-08-28 PROCEDURE — 92928 PRQ TCAT PLMT NTRAC ST 1 LES: CPT | Mod: RC

## 2018-08-28 PROCEDURE — 76937 US GUIDE VASCULAR ACCESS: CPT | Mod: 26

## 2018-08-28 PROCEDURE — 93010 ELECTROCARDIOGRAM REPORT: CPT

## 2018-08-28 PROCEDURE — 99152 MOD SED SAME PHYS/QHP 5/>YRS: CPT

## 2018-08-28 RX ORDER — METOPROLOL TARTRATE 50 MG
0 TABLET ORAL
Qty: 0 | Refills: 0 | COMMUNITY

## 2018-08-28 RX ORDER — HEPARIN SODIUM 5000 [USP'U]/ML
5000 INJECTION INTRAVENOUS; SUBCUTANEOUS EVERY 12 HOURS
Qty: 0 | Refills: 0 | Status: DISCONTINUED | OUTPATIENT
Start: 2018-08-29 | End: 2018-08-29

## 2018-08-28 RX ORDER — ATORVASTATIN CALCIUM 80 MG/1
80 TABLET, FILM COATED ORAL AT BEDTIME
Qty: 0 | Refills: 0 | Status: DISCONTINUED | OUTPATIENT
Start: 2018-08-28 | End: 2018-08-29

## 2018-08-28 RX ORDER — ASPIRIN/CALCIUM CARB/MAGNESIUM 324 MG
81 TABLET ORAL DAILY
Qty: 0 | Refills: 0 | Status: DISCONTINUED | OUTPATIENT
Start: 2018-08-29 | End: 2018-08-29

## 2018-08-28 RX ORDER — LISINOPRIL 2.5 MG/1
0 TABLET ORAL
Qty: 0 | Refills: 0 | COMMUNITY

## 2018-08-28 RX ORDER — METOPROLOL TARTRATE 50 MG
50 TABLET ORAL DAILY
Qty: 0 | Refills: 0 | Status: DISCONTINUED | OUTPATIENT
Start: 2018-08-28 | End: 2018-08-29

## 2018-08-28 RX ORDER — ATORVASTATIN CALCIUM 80 MG/1
0 TABLET, FILM COATED ORAL
Qty: 0 | Refills: 0 | COMMUNITY

## 2018-08-28 RX ORDER — PANTOPRAZOLE SODIUM 20 MG/1
40 TABLET, DELAYED RELEASE ORAL
Qty: 0 | Refills: 0 | Status: DISCONTINUED | OUTPATIENT
Start: 2018-08-28 | End: 2018-08-29

## 2018-08-28 RX ORDER — CLOPIDOGREL BISULFATE 75 MG/1
75 TABLET, FILM COATED ORAL DAILY
Qty: 0 | Refills: 0 | Status: DISCONTINUED | OUTPATIENT
Start: 2018-08-29 | End: 2018-08-29

## 2018-08-28 RX ORDER — GABAPENTIN 400 MG/1
1 CAPSULE ORAL
Qty: 0 | Refills: 0 | COMMUNITY

## 2018-08-28 RX ORDER — LISINOPRIL 2.5 MG/1
10 TABLET ORAL DAILY
Qty: 0 | Refills: 0 | Status: DISCONTINUED | OUTPATIENT
Start: 2018-08-28 | End: 2018-08-29

## 2018-08-28 RX ORDER — FENTANYL CITRATE 50 UG/ML
12.5 INJECTION INTRAVENOUS ONCE
Qty: 0 | Refills: 0 | Status: DISCONTINUED | OUTPATIENT
Start: 2018-08-28 | End: 2018-08-28

## 2018-08-28 RX ORDER — SODIUM CHLORIDE 9 MG/ML
3 INJECTION INTRAMUSCULAR; INTRAVENOUS; SUBCUTANEOUS EVERY 8 HOURS
Qty: 0 | Refills: 0 | Status: DISCONTINUED | OUTPATIENT
Start: 2018-08-28 | End: 2018-08-29

## 2018-08-28 RX ADMIN — FENTANYL CITRATE 12.5 MICROGRAM(S): 50 INJECTION INTRAVENOUS at 21:46

## 2018-08-28 RX ADMIN — FENTANYL CITRATE 12.5 MICROGRAM(S): 50 INJECTION INTRAVENOUS at 22:35

## 2018-08-28 RX ADMIN — SODIUM CHLORIDE 3 MILLILITER(S): 9 INJECTION INTRAMUSCULAR; INTRAVENOUS; SUBCUTANEOUS at 22:29

## 2018-08-28 RX ADMIN — ATORVASTATIN CALCIUM 80 MILLIGRAM(S): 80 TABLET, FILM COATED ORAL at 23:35

## 2018-08-28 NOTE — H&P CARDIOLOGY - PMH
CAD (coronary artery disease)    Chest pain    Essential hypertension    Gastritis, presence of bleeding unspecified, unspecified chronicity, unspecified gastritis type    GERD (gastroesophageal reflux disease)    Hypercholesterolemia    Liver abscess  1981  Old MI (myocardial infarction)  1999  S/P CABG x 3  1999, [facility]

## 2018-08-28 NOTE — CHART NOTE - NSCHARTNOTEFT_GEN_A_CORE
FEMORAL SHEATH PULL NOTE    Femoral arterial sheath 6Fr removed form right groin without any complications. Manual pressure held for 20 minutes with achievement of good hemostasis. No bleeding or hematoma. Patient tolerated procedure well. Positive peripheral pulses. Good capillary refill. Patient instructed to keep right lower extremity straight. RN instructed to monitor groin site for bleeding and hematoma and to follow post cardiac cath orders. Fentanyl 12. 5 mcg given for pain.

## 2018-08-28 NOTE — PATIENT PROFILE ADULT. - PRO SERVICES AT [facility]
04/18/17 1933   Child Life   Location ED  (CC: Fractured Wrist)   Intervention Procedure Support;Preparation  (Provided preparation and support for PIV and sedation for reduction and casting.)   Preparation Comment Provided Pt with preparation for PIV placement via hands on exploration of Jtip and IV catheter.    Anxiety Low Anxiety   Fears/Concerns none   Techniques Used to Cabery/Comfort/Calm diversional activity  (Pt engaged in games on iPad throughout PIV placement)   Able to Shift Focus From Anxiety Easy   Outcomes/Follow Up Continue to Follow/Support     
none

## 2018-08-28 NOTE — H&P CARDIOLOGY - HISTORY OF PRESENT ILLNESS
63 y.o. male presents today for elective cardiac catheterization. The patient c/o SOB with exertion (walking 2 blocks, climbing 1 flight of stairs). Admits to L sided chest pain on and off, aggravate with exertion and mental stress, 5/10, resolve on its own. Admits to occasional palpitations, dizziness. Denies b/l lower extremities edema, presyncope, syncope, melena, hematochezia, fever, chills, abdominal pain, N/v/D/C, urinary symptoms.  The patient was evaluated by his cardiologist, was referred for cardiac cath. The patient denies having recent stress test.

## 2018-08-29 ENCOUNTER — TRANSCRIPTION ENCOUNTER (OUTPATIENT)
Age: 63
End: 2018-08-29

## 2018-08-29 VITALS
DIASTOLIC BLOOD PRESSURE: 88 MMHG | TEMPERATURE: 98 F | OXYGEN SATURATION: 96 % | HEART RATE: 71 BPM | SYSTOLIC BLOOD PRESSURE: 148 MMHG | RESPIRATION RATE: 17 BRPM

## 2018-08-29 PROBLEM — K75.0 ABSCESS OF LIVER: Chronic | Status: ACTIVE | Noted: 2017-03-08

## 2018-08-29 LAB
BUN SERPL-MCNC: 11 MG/DL — SIGNIFICANT CHANGE UP (ref 7–23)
CALCIUM SERPL-MCNC: 8.7 MG/DL — SIGNIFICANT CHANGE UP (ref 8.4–10.5)
CHLORIDE SERPL-SCNC: 100 MMOL/L — SIGNIFICANT CHANGE UP (ref 98–107)
CO2 SERPL-SCNC: 24 MMOL/L — SIGNIFICANT CHANGE UP (ref 22–31)
CREAT SERPL-MCNC: 1.01 MG/DL — SIGNIFICANT CHANGE UP (ref 0.5–1.3)
GLUCOSE SERPL-MCNC: 105 MG/DL — HIGH (ref 70–99)
HCT VFR BLD CALC: 37.9 % — LOW (ref 39–50)
HGB BLD-MCNC: 12.6 G/DL — LOW (ref 13–17)
MAGNESIUM SERPL-MCNC: 2.1 MG/DL — SIGNIFICANT CHANGE UP (ref 1.6–2.6)
MCHC RBC-ENTMCNC: 28.4 PG — SIGNIFICANT CHANGE UP (ref 27–34)
MCHC RBC-ENTMCNC: 33.2 % — SIGNIFICANT CHANGE UP (ref 32–36)
MCV RBC AUTO: 85.6 FL — SIGNIFICANT CHANGE UP (ref 80–100)
NRBC # FLD: 0 — SIGNIFICANT CHANGE UP
PHOSPHATE SERPL-MCNC: 4 MG/DL — SIGNIFICANT CHANGE UP (ref 2.5–4.5)
PLATELET # BLD AUTO: 141 K/UL — LOW (ref 150–400)
PMV BLD: 11.6 FL — SIGNIFICANT CHANGE UP (ref 7–13)
POTASSIUM SERPL-MCNC: 3.7 MMOL/L — SIGNIFICANT CHANGE UP (ref 3.5–5.3)
POTASSIUM SERPL-SCNC: 3.7 MMOL/L — SIGNIFICANT CHANGE UP (ref 3.5–5.3)
RBC # BLD: 4.43 M/UL — SIGNIFICANT CHANGE UP (ref 4.2–5.8)
RBC # FLD: 12.4 % — SIGNIFICANT CHANGE UP (ref 10.3–14.5)
SODIUM SERPL-SCNC: 138 MMOL/L — SIGNIFICANT CHANGE UP (ref 135–145)
WBC # BLD: 8.4 K/UL — SIGNIFICANT CHANGE UP (ref 3.8–10.5)
WBC # FLD AUTO: 8.4 K/UL — SIGNIFICANT CHANGE UP (ref 3.8–10.5)

## 2018-08-29 RX ORDER — PANTOPRAZOLE SODIUM 20 MG/1
1 TABLET, DELAYED RELEASE ORAL
Qty: 30 | Refills: 0
Start: 2018-08-29 | End: 2018-09-27

## 2018-08-29 RX ORDER — METOPROLOL TARTRATE 50 MG
1 TABLET ORAL
Qty: 30 | Refills: 1
Start: 2018-08-29 | End: 2018-10-27

## 2018-08-29 RX ORDER — METOPROLOL TARTRATE 50 MG
1 TABLET ORAL
Qty: 0 | Refills: 0 | COMMUNITY

## 2018-08-29 RX ORDER — CLOPIDOGREL BISULFATE 75 MG/1
1 TABLET, FILM COATED ORAL
Qty: 30 | Refills: 2
Start: 2018-08-29 | End: 2018-11-26

## 2018-08-29 RX ORDER — CLOPIDOGREL BISULFATE 75 MG/1
1 TABLET, FILM COATED ORAL
Qty: 0 | Refills: 0 | COMMUNITY
Start: 2018-08-29

## 2018-08-29 RX ADMIN — Medication 50 MILLIGRAM(S): at 05:18

## 2018-08-29 RX ADMIN — PANTOPRAZOLE SODIUM 40 MILLIGRAM(S): 20 TABLET, DELAYED RELEASE ORAL at 05:18

## 2018-08-29 RX ADMIN — SODIUM CHLORIDE 3 MILLILITER(S): 9 INJECTION INTRAMUSCULAR; INTRAVENOUS; SUBCUTANEOUS at 05:18

## 2018-08-29 RX ADMIN — LISINOPRIL 10 MILLIGRAM(S): 2.5 TABLET ORAL at 05:18

## 2018-08-29 RX ADMIN — HEPARIN SODIUM 5000 UNIT(S): 5000 INJECTION INTRAVENOUS; SUBCUTANEOUS at 05:17

## 2018-08-29 RX ADMIN — SODIUM CHLORIDE 3 MILLILITER(S): 9 INJECTION INTRAMUSCULAR; INTRAVENOUS; SUBCUTANEOUS at 13:28

## 2018-08-29 NOTE — DISCHARGE NOTE ADULT - CARE PROVIDER_API CALL
FOLLOW UP,   WITH PRIMARY CARE PHYSICIAN  Phone: (   )    -  Fax: (   )    -    Deja Stacy), Cardiology; Internal Medicine  [address] [address] [place] [address]  [address]  [place], [place] [id number]  Phone: [phone]  Fax: [phone]

## 2018-08-29 NOTE — DISCHARGE NOTE ADULT - MEDICATION SUMMARY - MEDICATIONS TO TAKE
I will START or STAY ON the medications listed below when I get home from the hospital:    Aspirin Enteric Coated 81 mg oral delayed release tablet  -- 1 tab(s) by mouth once a day  -- Indication: For CAD S/P percutaneous coronary angioplasty    lisinopril 10 mg oral tablet  -- 1 tab(s) by mouth once a day  -- Indication: For HTN    gabapentin 300 mg oral capsule  -- 3 cap(s) by mouth once a day (at bedtime)  -- Indication: For DM    atorvastatin 80 mg oral tablet  -- 1 tab(s) by mouth once a day  -- Indication: For HLD    clopidogrel 75 mg oral tablet  -- 1 tab(s) by mouth once a day  -- Indication: For ANTICOAGULATION    Toprol- mg oral tablet, extended release  -- 1 tab(s) by mouth once a day   -- It is very important that you take or use this exactly as directed.  Do not skip doses or discontinue unless directed by your doctor.  May cause drowsiness.  Alcohol may intensify this effect.  Use care when operating dangerous machinery.  Some non-prescription drugs may aggravate your condition.  Read all labels carefully.  If a warning appears, check with your doctor before taking.  Swallow whole.  Do not crush.  Take with food or milk.  This drug may impair the ability to drive or operate machinery.  Use care until you become familiar with its effects.    -- Indication: For HTN    Zantac 150 oral tablet  -- 1 tab(s) by mouth once a day  -- Indication: For GERD (gastroesophageal reflux disease)    pantoprazole 40 mg oral delayed release tablet  -- 1 tab(s) by mouth once a day (before a meal)  -- Indication: For GERD (gastroesophageal reflux disease)

## 2018-08-29 NOTE — DISCHARGE NOTE ADULT - CARE PLAN
Principal Discharge DX:	CAD S/P percutaneous coronary angioplasty  Goal:	To be asymptomatic, to reduce risks factors such as hypertension, diabetes and hyperlipidemia to lower the risk of blood clots formation; and to prevent complications of coronary artery disease such as worsening chest pain, heart attack and death.  Assessment and plan of treatment:	Continue aspirin and Plavix, do not stop unless instructed by your physician.  Continue low salt, fat, cholesterol and carbohydrate diet. Follow up with cardiologist and primary care physician's routine appointment.  Secondary Diagnosis:	Essential hypertension  Goal:	To maintain a normal blood pressure to prevent heart attack, stroke and renal failure.  Assessment and plan of treatment:	Low sodium and fat diet, continue anti-hypertensive medications, and follow up with primary care physician.  Secondary Diagnosis:	Gastritis, presence of bleeding unspecified, unspecified chronicity, unspecified gastritis type  Goal:	To prevent acid reflux, heartburn and chest pain.  Assessment and plan of treatment:	Avoid fatty, fried foods, acidic foods such as tomatoes, lime and chocolate. Continue to take your medications as prescribed, exercise daily and weight loss.  Secondary Diagnosis:	GERD (gastroesophageal reflux disease)  Goal:	To prevent acid reflux, heartburn and chest pain.  Assessment and plan of treatment:	Avoid fatty, fried foods, acidic foods such as tomatoes, lime and chocolate. Continue to take your medications as prescribed, exercise daily and weight loss.  Secondary Diagnosis:	Hypercholesterolemia  Goal:	To maintain normal cholesterol levels to prevent stroke, coronary artery disease, peripheral vascular disease and heart attacks.  Assessment and plan of treatment:	Low fat diet, exercise daily and continue current medications. Follow up with primary care physician and cardiologist for management. Principal Discharge DX:	CAD S/P percutaneous coronary angioplasty  Goal:	To be asymptomatic, to reduce risks factors such as hypertension, diabetes and hyperlipidemia to lower the risk of blood clots formation; and to prevent complications of coronary artery disease such as worsening chest pain, heart attack and death.  Assessment and plan of treatment:	You underwent cardiac catherization with three stents placed. Continue aspirin and Plavix, do not stop unless instructed by your physician.  Continue low salt, fat, cholesterol and carbohydrate diet. Follow up with cardiologist and primary care physician's routine appointment.  Secondary Diagnosis:	Essential hypertension  Goal:	To maintain a normal blood pressure to prevent heart attack, stroke and renal failure.  Assessment and plan of treatment:	Low sodium and fat diet, continue anti-hypertensive medications, and follow up with primary care physician.  Secondary Diagnosis:	Gastritis, presence of bleeding unspecified, unspecified chronicity, unspecified gastritis type  Goal:	To prevent acid reflux, heartburn and chest pain.  Assessment and plan of treatment:	Avoid fatty, fried foods, acidic foods such as tomatoes, lime and chocolate. Continue to take your medications as prescribed, exercise daily and weight loss.  Secondary Diagnosis:	GERD (gastroesophageal reflux disease)  Goal:	To prevent acid reflux, heartburn and chest pain.  Assessment and plan of treatment:	Avoid fatty, fried foods, acidic foods such as tomatoes, lime and chocolate. Continue to take your medications as prescribed, exercise daily and weight loss.  Secondary Diagnosis:	Hypercholesterolemia  Goal:	To maintain normal cholesterol levels to prevent stroke, coronary artery disease, peripheral vascular disease and heart attacks.  Assessment and plan of treatment:	Low fat diet, exercise daily and continue current medications. Follow up with primary care physician and cardiologist for management. Principal Discharge DX:	CAD S/P percutaneous coronary angioplasty  Goal:	To be asymptomatic, to reduce risks factors such as hypertension, diabetes and hyperlipidemia to lower the risk of blood clots formation; and to prevent complications of coronary artery disease such as worsening chest pain, heart attack and death.  Assessment and plan of treatment:	You underwent cardiac catherization with three stents placed. Continue aspirin and Plavix, do not stop unless instructed by your physician.  Continue low salt, fat, cholesterol and carbohydrate diet. Follow up with cardiologist and primary care physician's routine appointment. DO NOT TAKE SILDENAFIL.  Secondary Diagnosis:	Essential hypertension  Goal:	To maintain a normal blood pressure to prevent heart attack, stroke and renal failure.  Assessment and plan of treatment:	Low sodium and fat diet, continue anti-hypertensive medications, and follow up with primary care physician.  Secondary Diagnosis:	Gastritis, presence of bleeding unspecified, unspecified chronicity, unspecified gastritis type  Goal:	To prevent acid reflux, heartburn and chest pain.  Assessment and plan of treatment:	Avoid fatty, fried foods, acidic foods such as tomatoes, lime and chocolate. Continue to take your medications as prescribed, exercise daily and weight loss.  Secondary Diagnosis:	GERD (gastroesophageal reflux disease)  Goal:	To prevent acid reflux, heartburn and chest pain.  Assessment and plan of treatment:	Avoid fatty, fried foods, acidic foods such as tomatoes, lime and chocolate. Continue to take your medications as prescribed, exercise daily and weight loss.  Secondary Diagnosis:	Hypercholesterolemia  Goal:	To maintain normal cholesterol levels to prevent stroke, coronary artery disease, peripheral vascular disease and heart attacks.  Assessment and plan of treatment:	Low fat diet, exercise daily and continue current medications. Follow up with primary care physician and cardiologist for management.

## 2018-08-29 NOTE — DISCHARGE NOTE ADULT - SECONDARY DIAGNOSIS.
Essential hypertension Gastritis, presence of bleeding unspecified, unspecified chronicity, unspecified gastritis type GERD (gastroesophageal reflux disease) Hypercholesterolemia

## 2018-08-29 NOTE — DISCHARGE NOTE ADULT - NS AS ACTIVITY OBS
No Heavy lifting/straining/No heavy lifting x one week. No strenuous activity x 3 weeks. Monitor site of procedure and notify your doctor for any redness, swelling, discharge. No driving x 24 hours. You may shower but no baths or swimming x one week. Showering allowed/Walking-Indoors allowed/Walking-Outdoors allowed/No Heavy lifting/straining/No heavy lifting x one week. No strenuous activity x 3 weeks. Monitor site of procedure and notify your doctor for any redness, swelling, discharge. No driving x 24 hours. You may shower but no baths or swimming x one week.

## 2018-08-29 NOTE — DISCHARGE NOTE ADULT - ADDITIONAL INSTRUCTIONS
Follow up with PCP within 1-2 weeks of discharge.   Follow up with cardiology within 1-2 weeks of discharge. Follow up with PCP within 1-2 weeks of discharge.   Follow up with cardiology within 1-2 weeks of discharge.    Monitor site of procedure for swelling, redness or bleeding. Please notify your doctor if any of these symptoms are noted. No heavy lifting with procedure wrist greater than 5-10 lbs for one week. No strenuous activity for 3 weeks. You may shower.

## 2018-08-29 NOTE — DISCHARGE NOTE ADULT - PATIENT PORTAL LINK FT
You can access the [facility] Patient Portal, offered by [facility], by registering with the following website: [email or web address]

## 2018-08-29 NOTE — DISCHARGE NOTE ADULT - MEDICATION SUMMARY - MEDICATIONS TO STOP TAKING
I will STOP taking the medications listed below when I get home from the hospital:    sildenafil 25 mg oral tablet  -- 1 tab(s) by mouth once a day, As Needed

## 2018-08-29 NOTE — CHART NOTE - NSCHARTNOTEFT_GEN_A_CORE
Pt seen and examined. R groin is soft, nontender with no hematoma. Good peripheral pulses. Pt denies any chest pain. Pt is on ASA, Plavix, statin. Pt may need intervention on his LAD in the future but this can be decided as an outpatient. Pt should follow up in the cardiology clinic with Dr. Ochoa Ybarra. Pt seen and examined. R groin is soft, nontender with no hematoma. Good peripheral pulses. Pt denies any chest pain. Pt is on ASA, Plavix, statin, Toprol, Lisinopril. Please increase Toprol to 100 mg daily. Pt may need intervention on his LAD in the future but this can be decided as an outpatient. Pt should follow up in the cardiology clinic with Dr. Ochoa Ybarra.

## 2018-08-29 NOTE — DISCHARGE NOTE ADULT - PROVIDER TOKENS
FREE:[LAST:[FOLLOW UP],PHONE:[(   )    -],FAX:[(   )    -],ADDRESS:[WITH PRIMARY CARE PHYSICIAN]],TOKEN:'[id number]'

## 2018-08-29 NOTE — DISCHARGE NOTE ADULT - PLAN OF CARE
To be asymptomatic, to reduce risks factors such as hypertension, diabetes and hyperlipidemia to lower the risk of blood clots formation; and to prevent complications of coronary artery disease such as worsening chest pain, heart attack and death. Continue aspirin and Plavix, do not stop unless instructed by your physician.  Continue low salt, fat, cholesterol and carbohydrate diet. Follow up with cardiologist and primary care physician's routine appointment. To maintain a normal blood pressure to prevent heart attack, stroke and renal failure. Low sodium and fat diet, continue anti-hypertensive medications, and follow up with primary care physician. To prevent acid reflux, heartburn and chest pain. Avoid fatty, fried foods, acidic foods such as tomatoes, lime and chocolate. Continue to take your medications as prescribed, exercise daily and weight loss. To maintain normal cholesterol levels to prevent stroke, coronary artery disease, peripheral vascular disease and heart attacks. Low fat diet, exercise daily and continue current medications. Follow up with primary care physician and cardiologist for management. You underwent cardiac catherization with three stents placed. Continue aspirin and Plavix, do not stop unless instructed by your physician.  Continue low salt, fat, cholesterol and carbohydrate diet. Follow up with cardiologist and primary care physician's routine appointment. You underwent cardiac catherization with three stents placed. Continue aspirin and Plavix, do not stop unless instructed by your physician.  Continue low salt, fat, cholesterol and carbohydrate diet. Follow up with cardiologist and primary care physician's routine appointment. DO NOT TAKE SILDENAFIL.

## 2018-08-29 NOTE — DISCHARGE NOTE ADULT - HOSPITAL COURSE
63 y.o. male presents today for elective cardiac catheterization  [facility]: pRCA 100% x2 OLYA, mRCA 100% x1 OLYA, LVEDP 25, 63 y.o. male, PMHx of HTN, HLD, gastritis who presented  for elective cardiac catheterization .     [facility]: pRCA 100% x2 OLYA, mRCA 100% x1 OLYA, LVEDP 25,    Patient seen and evaluated, no acute somatic complaints, cath without complications. Medically cleared/stable for discharge as per   with close outpatient follow up within 1-2 weeks of discharge.

## 2018-09-06 ENCOUNTER — APPOINTMENT (OUTPATIENT)
Dept: CV DIAGNOSTICS | Facility: HOSPITAL | Age: 63
End: 2018-09-06

## 2018-09-06 PROBLEM — K29.70 GASTRITIS, UNSPECIFIED, WITHOUT BLEEDING: Chronic | Status: ACTIVE | Noted: 2018-08-28

## 2018-09-18 ENCOUNTER — LABORATORY RESULT (OUTPATIENT)
Age: 63
End: 2018-09-18

## 2018-09-18 ENCOUNTER — APPOINTMENT (OUTPATIENT)
Dept: INTERNAL MEDICINE | Facility: HOSPITAL | Age: 63
End: 2018-09-18
Payer: MEDICARE

## 2018-09-18 ENCOUNTER — OUTPATIENT (OUTPATIENT)
Dept: OUTPATIENT SERVICES | Facility: HOSPITAL | Age: 63
LOS: 1 days | End: 2018-09-18

## 2018-09-18 VITALS — WEIGHT: 183 LBS | BODY MASS INDEX: 28.72 KG/M2 | HEIGHT: 67 IN

## 2018-09-18 VITALS — HEART RATE: 64 BPM | SYSTOLIC BLOOD PRESSURE: 112 MMHG | DIASTOLIC BLOOD PRESSURE: 64 MMHG

## 2018-09-18 DIAGNOSIS — Z95.1 PRESENCE OF AORTOCORONARY BYPASS GRAFT: Chronic | ICD-10-CM

## 2018-09-18 LAB
ALBUMIN SERPL ELPH-MCNC: 4.3 G/DL — SIGNIFICANT CHANGE UP (ref 3.3–5)
ALP SERPL-CCNC: 86 U/L — SIGNIFICANT CHANGE UP (ref 40–120)
ALT FLD-CCNC: 19 U/L — SIGNIFICANT CHANGE UP (ref 4–41)
AST SERPL-CCNC: 18 U/L — SIGNIFICANT CHANGE UP (ref 4–40)
BASOPHILS # BLD AUTO: 0.03 K/UL — SIGNIFICANT CHANGE UP (ref 0–0.2)
BASOPHILS NFR BLD AUTO: 0.4 % — SIGNIFICANT CHANGE UP (ref 0–2)
BILIRUB SERPL-MCNC: 0.4 MG/DL — SIGNIFICANT CHANGE UP (ref 0.2–1.2)
BUN SERPL-MCNC: 13 MG/DL — SIGNIFICANT CHANGE UP (ref 7–23)
CALCIUM SERPL-MCNC: 9.1 MG/DL — SIGNIFICANT CHANGE UP (ref 8.4–10.5)
CHLORIDE SERPL-SCNC: 102 MMOL/L — SIGNIFICANT CHANGE UP (ref 98–107)
CHOLEST SERPL-MCNC: 176 MG/DL — SIGNIFICANT CHANGE UP (ref 120–199)
CO2 SERPL-SCNC: 27 MMOL/L — SIGNIFICANT CHANGE UP (ref 22–31)
CREAT SERPL-MCNC: 1.17 MG/DL — SIGNIFICANT CHANGE UP (ref 0.5–1.3)
EOSINOPHIL # BLD AUTO: 0.13 K/UL — SIGNIFICANT CHANGE UP (ref 0–0.5)
EOSINOPHIL NFR BLD AUTO: 1.6 % — SIGNIFICANT CHANGE UP (ref 0–6)
GLUCOSE SERPL-MCNC: 67 MG/DL — LOW (ref 70–99)
HBA1C BLD-MCNC: 6.2 % — HIGH (ref 4–5.6)
HCT VFR BLD CALC: 41.4 % — SIGNIFICANT CHANGE UP (ref 39–50)
HDLC SERPL-MCNC: 31 MG/DL — LOW (ref 35–55)
HGB BLD-MCNC: 13.4 G/DL — SIGNIFICANT CHANGE UP (ref 13–17)
IMM GRANULOCYTES # BLD AUTO: 0.07 # — SIGNIFICANT CHANGE UP
IMM GRANULOCYTES NFR BLD AUTO: 0.8 % — SIGNIFICANT CHANGE UP (ref 0–1.5)
LIPID PNL WITH DIRECT LDL SERPL: 86 MG/DL — SIGNIFICANT CHANGE UP
LYMPHOCYTES # BLD AUTO: 2.93 K/UL — SIGNIFICANT CHANGE UP (ref 1–3.3)
LYMPHOCYTES # BLD AUTO: 35.1 % — SIGNIFICANT CHANGE UP (ref 13–44)
MCHC RBC-ENTMCNC: 29.2 PG — SIGNIFICANT CHANGE UP (ref 27–34)
MCHC RBC-ENTMCNC: 32.4 % — SIGNIFICANT CHANGE UP (ref 32–36)
MCV RBC AUTO: 90.2 FL — SIGNIFICANT CHANGE UP (ref 80–100)
MONOCYTES # BLD AUTO: 1.04 K/UL — HIGH (ref 0–0.9)
MONOCYTES NFR BLD AUTO: 12.5 % — SIGNIFICANT CHANGE UP (ref 2–14)
NEUTROPHILS # BLD AUTO: 4.15 K/UL — SIGNIFICANT CHANGE UP (ref 1.8–7.4)
NEUTROPHILS NFR BLD AUTO: 49.6 % — SIGNIFICANT CHANGE UP (ref 43–77)
NRBC # FLD: 0 — SIGNIFICANT CHANGE UP
PLATELET # BLD AUTO: 184 K/UL — SIGNIFICANT CHANGE UP (ref 150–400)
PMV BLD: 11.2 FL — SIGNIFICANT CHANGE UP (ref 7–13)
POTASSIUM SERPL-MCNC: 3.9 MMOL/L — SIGNIFICANT CHANGE UP (ref 3.5–5.3)
POTASSIUM SERPL-SCNC: 3.9 MMOL/L — SIGNIFICANT CHANGE UP (ref 3.5–5.3)
PROT SERPL-MCNC: 7.5 G/DL — SIGNIFICANT CHANGE UP (ref 6–8.3)
RBC # BLD: 4.59 M/UL — SIGNIFICANT CHANGE UP (ref 4.2–5.8)
RBC # FLD: 12.3 % — SIGNIFICANT CHANGE UP (ref 10.3–14.5)
SODIUM SERPL-SCNC: 139 MMOL/L — SIGNIFICANT CHANGE UP (ref 135–145)
TRIGL SERPL-MCNC: 487 MG/DL — HIGH (ref 10–149)
WBC # BLD: 8.35 K/UL — SIGNIFICANT CHANGE UP (ref 3.8–10.5)
WBC # FLD AUTO: 8.35 K/UL — SIGNIFICANT CHANGE UP (ref 3.8–10.5)

## 2018-09-18 PROCEDURE — 99214 OFFICE O/P EST MOD 30 MIN: CPT | Mod: GC

## 2018-09-19 LAB
CREAT UR-MCNC: 134 MG/DL — SIGNIFICANT CHANGE UP
MICROALBUMIN UR-MCNC: < 1.2 MG/DL — SIGNIFICANT CHANGE UP

## 2018-09-20 ENCOUNTER — APPOINTMENT (OUTPATIENT)
Dept: CARDIOLOGY | Facility: HOSPITAL | Age: 63
End: 2018-09-20

## 2018-09-20 ENCOUNTER — OUTPATIENT (OUTPATIENT)
Dept: OUTPATIENT SERVICES | Facility: HOSPITAL | Age: 63
LOS: 1 days | End: 2018-09-20

## 2018-09-20 VITALS
DIASTOLIC BLOOD PRESSURE: 70 MMHG | BODY MASS INDEX: 28.98 KG/M2 | WEIGHT: 185 LBS | HEART RATE: 68 BPM | OXYGEN SATURATION: 97 % | SYSTOLIC BLOOD PRESSURE: 120 MMHG | RESPIRATION RATE: 15 BRPM

## 2018-09-20 DIAGNOSIS — Z95.1 PRESENCE OF AORTOCORONARY BYPASS GRAFT: Chronic | ICD-10-CM

## 2018-09-20 NOTE — REVIEW OF SYSTEMS
[Chest Pain] : chest pain [Palpitations] : palpitations [Cough] : cough [Dyspnea on Exertion] : dyspnea on exertion [Heartburn] : heartburn [Joint Pain] : joint pain [Fever] : no fever [Chills] : no chills [Fatigue] : no fatigue [Hot Flashes] : no hot flashes [Night Sweats] : no night sweats [Pain] : no pain [Redness] : no redness [Itching] : no itching [Lower Ext Edema] : no lower extremity edema [Orthopena] : no orthopnea [Paroysmal Nocturnal Dyspnea] : no paroysmal nocturnal dyspnea [Shortness Of Breath] : no shortness of breath [Wheezing] : no wheezing [Abdominal Pain] : no abdominal pain [Nausea] : no nausea [Constipation] : no constipation [Diarrhea] : no diarrhea [Vomiting] : no vomiting [Melena] : no melena [Dysuria] : no dysuria [Incontinence] : no incontinence [Hematuria] : no hematuria [Joint Stiffness] : no joint stiffness [Joint Swelling] : no joint swelling [Itching] : no itching [Skin Rash] : no skin rash [Headache] : no headache [Dizziness] : no dizziness [Fainting] : no fainting [Unsteady Walk] : no ataxia [FreeTextEntry9] : Endorses mild joint pain

## 2018-09-20 NOTE — PLAN
[FreeTextEntry1] : 64yo M with PMHx of peripheral neuropathy, elevated A1C, CAD s/p CABG and 3x stent placement, HTN, HLD, insomnia and ED presenting for follow up\par \par 1) CAD\par - Continue statin and blood pressure medications\par - F/U with cardiology appt in 2 days\par - Currently without concerning symptoms. Able to continue ADLs without assistance.\par - Continue aspirin 81mg daily\par \par 2) HLD\par - Continue atorvastatin 80mg daily\par \par 3) Erectile Dysfunction\par - Pt endorses satisfaction with sildenafil. Allow to continue PRN for now.\par \par 4) Peripheral Neuropathy\par - Will change gabapentin to 600mg TID instead of 900mg daily\par \par 5) Elevated A1C\par - Will re-obtain A1c today\par - Will obtain urine microalbumin today\par - F/U with ophthalmology for eye exam\par - Left foot exam sensation partially diminished. Will need to keep a close eye on diabetic foot ulcers.\par - Pt agreed to try to remove sugary drinks and juices and replace with water.\par \par 6) HCM\par - Flu vaccine today\par - Other vaccines are UTD\par - Pt refuses colonoscopy due to mother having a bad outcome from one. Will give a FIT test today.\par - F/U in clinic in 6 months.\par \par \par Sam Cruz\par EM/IM PGY-1\par \par 6) Insomnia

## 2018-09-20 NOTE — PHYSICAL EXAM
[No Acute Distress] : no acute distress [Well Nourished] : well nourished [Well Developed] : well developed [Well-Appearing] : well-appearing [Normal Voice/Communication] : normal voice/communication [PERRL] : pupils equal round and reactive to light [EOMI] : extraocular movements intact [Normal Outer Ear/Nose] : the outer ears and nose were normal in appearance [Normal Oropharynx] : the oropharynx was normal [No JVD] : no jugular venous distention [Supple] : supple [No Lymphadenopathy] : no lymphadenopathy [No Respiratory Distress] : no respiratory distress  [Clear to Auscultation] : lungs were clear to auscultation bilaterally [No Accessory Muscle Use] : no accessory muscle use [Normal Rate] : normal rate  [Regular Rhythm] : with a regular rhythm [Normal S1, S2] : normal S1 and S2 [No Carotid Bruits] : no carotid bruits [No Abdominal Bruit] : a ~M bruit was not heard ~T in the abdomen [No Varicosities] : no varicosities [No Edema] : there was no peripheral edema [No Palpable Aorta] : no palpable aorta [Soft] : abdomen soft [Non Tender] : non-tender [Non-distended] : non-distended [No Masses] : no abdominal mass palpated [No HSM] : no HSM [Normal Bowel Sounds] : normal bowel sounds [No CVA Tenderness] : no CVA  tenderness [No Spinal Tenderness] : no spinal tenderness [No Rash] : no rash [Normal Gait] : normal gait [Coordination Grossly Intact] : coordination grossly intact [No Focal Deficits] : no focal deficits [None] : no ulcers in either foot were found [] : right foot [___] : left foot points [unfilled] [de-identified] : 2/6 systolic murmur heard loudest in the right upper sternal border.

## 2018-09-20 NOTE — HISTORY OF PRESENT ILLNESS
[FreeTextEntry1] : 64yo M presenting for a post discharge follow up [de-identified] : 64yo M with PMHx of elevated A1c, HTN, HLD, peripheral neuropathy and erectile dysfunction presenting for follow up after admission in late August for chest pain that ended with right heart catheterization with 3x drug eluting stent placement. Pt without any specific complaints today.\par \par Interval Hx:\par \par 1) CAD - Pt currently endorses taking all of his medications and denies any severe shortness of breath. He can walk 3-5 blocks before requiring to take a break and can climb 1-2 flights of stairs before needing to stop. He denies any swelling in his legs or shortness of breath. He does endorse occasional chest pain that occurs with strenuous activity and resolves with rest.\par \par 2) Erectile Dysfunction - States that he occasionally uses sildenafil and that it works well and he has no complaints. He did not want any refills and says he has enough for what he needs.\par \par 3) Peripheral Neuropathy/Diabetes - Pt denies any episodes of increased urination or abdominal pain, nausea or vomiting. He also denies any episodes of fainting of lightheadedness. Pt does endorse continuing pain in his feet with occasional numbness. He states that he takes his gabapentin only at night.\par \par 4) Insomnia - Pt endorses difficulty sleeping at night and is requesting medications for sleep.

## 2018-09-21 DIAGNOSIS — Z23 ENCOUNTER FOR IMMUNIZATION: ICD-10-CM

## 2018-09-21 DIAGNOSIS — G62.9 POLYNEUROPATHY, UNSPECIFIED: ICD-10-CM

## 2018-09-21 DIAGNOSIS — R73.09 OTHER ABNORMAL GLUCOSE: ICD-10-CM

## 2018-09-21 DIAGNOSIS — I20.9 ANGINA PECTORIS, UNSPECIFIED: ICD-10-CM

## 2018-09-21 DIAGNOSIS — E78.5 HYPERLIPIDEMIA, UNSPECIFIED: ICD-10-CM

## 2018-09-21 DIAGNOSIS — Z00.00 ENCOUNTER FOR GENERAL ADULT MEDICAL EXAMINATION WITHOUT ABNORMAL FINDINGS: ICD-10-CM

## 2018-09-21 DIAGNOSIS — I10 ESSENTIAL (PRIMARY) HYPERTENSION: ICD-10-CM

## 2018-09-25 ENCOUNTER — APPOINTMENT (OUTPATIENT)
Dept: CARDIOLOGY | Facility: HOSPITAL | Age: 63
End: 2018-09-25

## 2018-09-25 ENCOUNTER — OUTPATIENT (OUTPATIENT)
Dept: OUTPATIENT SERVICES | Facility: HOSPITAL | Age: 63
LOS: 1 days | End: 2018-09-25

## 2018-09-25 ENCOUNTER — APPOINTMENT (OUTPATIENT)
Dept: OPHTHALMOLOGY | Facility: CLINIC | Age: 63
End: 2018-09-25
Payer: MEDICARE

## 2018-09-25 DIAGNOSIS — Z95.1 PRESENCE OF AORTOCORONARY BYPASS GRAFT: Chronic | ICD-10-CM

## 2018-09-25 DIAGNOSIS — E78.5 HYPERLIPIDEMIA, UNSPECIFIED: ICD-10-CM

## 2018-09-25 LAB
CHOLEST SERPL-MCNC: 169 MG/DL — SIGNIFICANT CHANGE UP (ref 120–199)
HDLC SERPL-MCNC: 35 MG/DL — SIGNIFICANT CHANGE UP (ref 35–55)
LIPID PNL WITH DIRECT LDL SERPL: 92 MG/DL — SIGNIFICANT CHANGE UP
TRIGL SERPL-MCNC: 382 MG/DL — HIGH (ref 10–149)

## 2018-09-25 PROCEDURE — 92004 COMPRE OPH EXAM NEW PT 1/>: CPT

## 2018-09-26 DIAGNOSIS — E78.5 HYPERLIPIDEMIA, UNSPECIFIED: ICD-10-CM

## 2018-10-03 ENCOUNTER — APPOINTMENT (OUTPATIENT)
Dept: DERMATOLOGY | Facility: CLINIC | Age: 63
End: 2018-10-03

## 2018-11-01 ENCOUNTER — APPOINTMENT (OUTPATIENT)
Dept: CARDIOLOGY | Facility: HOSPITAL | Age: 63
End: 2018-11-01

## 2019-02-03 ENCOUNTER — EMERGENCY (EMERGENCY)
Facility: HOSPITAL | Age: 64
LOS: 1 days | Discharge: HOME | End: 2019-02-03
Admitting: EMERGENCY MEDICINE
Payer: MEDICARE

## 2019-02-03 VITALS
HEART RATE: 69 BPM | OXYGEN SATURATION: 97 % | RESPIRATION RATE: 17 BRPM | TEMPERATURE: 98 F | SYSTOLIC BLOOD PRESSURE: 134 MMHG | DIASTOLIC BLOOD PRESSURE: 89 MMHG

## 2019-02-03 DIAGNOSIS — Z95.1 PRESENCE OF AORTOCORONARY BYPASS GRAFT: Chronic | ICD-10-CM

## 2019-02-03 PROCEDURE — 99282 EMERGENCY DEPT VISIT SF MDM: CPT

## 2019-02-03 RX ORDER — DIPHENHYDRAMINE HCL 50 MG
25 CAPSULE ORAL ONCE
Qty: 0 | Refills: 0 | Status: DISCONTINUED | OUTPATIENT
Start: 2019-02-03 | End: 2019-02-07

## 2019-02-03 RX ORDER — DIPHENHYDRAMINE HCL 50 MG
25 CAPSULE ORAL ONCE
Qty: 0 | Refills: 0 | Status: COMPLETED | OUTPATIENT
Start: 2019-02-03 | End: 2019-02-03

## 2019-02-03 RX ADMIN — Medication 25 MILLIGRAM(S): at 21:51

## 2019-02-03 NOTE — ED ADULT NURSE NOTE - OBJECTIVE STATEMENT
pt on bed aox3 c/o burning itchiness rashes on his buttocks for 10 days, rusted noted will monitor PMHx of HTN HLD CP

## 2019-02-03 NOTE — ED ADULT NURSE NOTE - NSIMPLEMENTINTERV_GEN_ALL_ED
Implemented All Universal Safety Interventions:  [place] to call system. Call bell, personal items and telephone within reach. Instruct patient to call for assistance. Room bathroom lighting operational. Non-slip footwear when patient is off stretcher. Physically safe environment: no spills, clutter or unnecessary equipment. Stretcher in lowest position, wheels locked, appropriate side rails in place.

## 2019-02-03 NOTE — ED ADULT TRIAGE NOTE - CHIEF COMPLAINT QUOTE
Patient c/o B/L buttocks pain and itchiness xfew days. Patient reports using an "itchy cream" from [facility] (unsure what the name is) with mild relief. Patient reports "pimples" all over. Hx. HTN, CABG in 1999. Patient c/o B/L buttocks pain and itchiness xfew days. Patient reports using an "itchy cream" from [facility] (unsure what the name is) with mild relief. Patient reports "pimples" on buttocks. Hx. HTN, CABG in 1999.

## 2019-02-03 NOTE — ED PROVIDER NOTE - MEDICAL DECISION MAKING DETAILS
65 y/o male w/ shingles- day 10, so antivirals will not be effective. Rec to uses OTC pain control, benadryl for itchiness. pmd f/u.

## 2019-02-03 NOTE — ED ADULT NURSE NOTE - PMH
CAD (coronary artery disease)    Chest pain    Essential hypertension    Gastritis, presence of bleeding unspecified, unspecified chronicity, unspecified gastritis type    GERD (gastroesophageal reflux disease)    Hypercholesterolemia    Liver abscess  1981  Old MI (myocardial infarction)  1999  S/P CABG x 3  1999, [facility]

## 2019-02-04 NOTE — DISCUSSION/SUMMARY
[ED Visit] : a visit to ED [FreeTextEntry1] : Pt seen in the ER for buttock rash; dx with shingles with crusted lesions; no antivirals rx.

## 2019-03-13 ENCOUNTER — APPOINTMENT (OUTPATIENT)
Dept: INTERNAL MEDICINE | Facility: HOSPITAL | Age: 64
End: 2019-03-13

## 2019-03-20 ENCOUNTER — APPOINTMENT (OUTPATIENT)
Dept: OPHTHALMOLOGY | Facility: CLINIC | Age: 64
End: 2019-03-20

## 2019-04-17 ENCOUNTER — APPOINTMENT (OUTPATIENT)
Dept: INTERNAL MEDICINE | Facility: HOSPITAL | Age: 64
End: 2019-04-17

## 2019-04-22 ENCOUNTER — FORM ENCOUNTER (OUTPATIENT)
Age: 64
End: 2019-04-22

## 2019-04-22 ENCOUNTER — OTHER (OUTPATIENT)
Age: 64
End: 2019-04-22

## 2019-04-23 ENCOUNTER — APPOINTMENT (OUTPATIENT)
Dept: INTERNAL MEDICINE | Facility: HOSPITAL | Age: 64
End: 2019-04-23
Payer: MEDICARE

## 2019-04-23 ENCOUNTER — OUTPATIENT (OUTPATIENT)
Dept: OUTPATIENT SERVICES | Facility: HOSPITAL | Age: 64
LOS: 1 days | End: 2019-04-23
Payer: MEDICARE

## 2019-04-23 ENCOUNTER — LABORATORY RESULT (OUTPATIENT)
Age: 64
End: 2019-04-23

## 2019-04-23 ENCOUNTER — OTHER (OUTPATIENT)
Age: 64
End: 2019-04-23

## 2019-04-23 ENCOUNTER — APPOINTMENT (OUTPATIENT)
Dept: RADIOLOGY | Facility: HOSPITAL | Age: 64
End: 2019-04-23

## 2019-04-23 VITALS — BODY MASS INDEX: 29.22 KG/M2 | HEIGHT: 67 IN | WEIGHT: 186.19 LBS

## 2019-04-23 DIAGNOSIS — Z95.1 PRESENCE OF AORTOCORONARY BYPASS GRAFT: Chronic | ICD-10-CM

## 2019-04-23 DIAGNOSIS — M75.90 SHOULDER LESION, UNSPECIFIED, UNSPECIFIED SHOULDER: ICD-10-CM

## 2019-04-23 LAB
ALBUMIN SERPL ELPH-MCNC: 4.4 G/DL — SIGNIFICANT CHANGE UP (ref 3.3–5)
ALP SERPL-CCNC: 101 U/L — SIGNIFICANT CHANGE UP (ref 40–120)
ALT FLD-CCNC: 23 U/L — SIGNIFICANT CHANGE UP (ref 4–41)
ANION GAP SERPL CALC-SCNC: 12 MMO/L — SIGNIFICANT CHANGE UP (ref 7–14)
AST SERPL-CCNC: 23 U/L — SIGNIFICANT CHANGE UP (ref 4–40)
BASOPHILS # BLD AUTO: 0.02 K/UL — SIGNIFICANT CHANGE UP (ref 0–0.2)
BASOPHILS NFR BLD AUTO: 0.2 % — SIGNIFICANT CHANGE UP (ref 0–2)
BILIRUB SERPL-MCNC: 0.3 MG/DL — SIGNIFICANT CHANGE UP (ref 0.2–1.2)
BUN SERPL-MCNC: 14 MG/DL — SIGNIFICANT CHANGE UP (ref 7–23)
CALCIUM SERPL-MCNC: 9.8 MG/DL — SIGNIFICANT CHANGE UP (ref 8.4–10.5)
CHLORIDE SERPL-SCNC: 100 MMOL/L — SIGNIFICANT CHANGE UP (ref 98–107)
CHOLEST SERPL-MCNC: 176 MG/DL — SIGNIFICANT CHANGE UP (ref 120–199)
CO2 SERPL-SCNC: 27 MMOL/L — SIGNIFICANT CHANGE UP (ref 22–31)
CREAT SERPL-MCNC: 1.13 MG/DL — SIGNIFICANT CHANGE UP (ref 0.5–1.3)
EOSINOPHIL # BLD AUTO: 0.11 K/UL — SIGNIFICANT CHANGE UP (ref 0–0.5)
EOSINOPHIL NFR BLD AUTO: 1.3 % — SIGNIFICANT CHANGE UP (ref 0–6)
GLUCOSE SERPL-MCNC: 95 MG/DL — SIGNIFICANT CHANGE UP (ref 70–99)
HBA1C BLD-MCNC: 6.2 % — HIGH (ref 4–5.6)
HCT VFR BLD CALC: 41.6 % — SIGNIFICANT CHANGE UP (ref 39–50)
HDLC SERPL-MCNC: 42 MG/DL — SIGNIFICANT CHANGE UP (ref 35–55)
HGB BLD-MCNC: 13 G/DL — SIGNIFICANT CHANGE UP (ref 13–17)
IMM GRANULOCYTES NFR BLD AUTO: 0.8 % — SIGNIFICANT CHANGE UP (ref 0–1.5)
LIPID PNL WITH DIRECT LDL SERPL: 111 MG/DL — SIGNIFICANT CHANGE UP
LYMPHOCYTES # BLD AUTO: 2.16 K/UL — SIGNIFICANT CHANGE UP (ref 1–3.3)
LYMPHOCYTES # BLD AUTO: 25.9 % — SIGNIFICANT CHANGE UP (ref 13–44)
MCHC RBC-ENTMCNC: 28.6 PG — SIGNIFICANT CHANGE UP (ref 27–34)
MCHC RBC-ENTMCNC: 31.3 % — LOW (ref 32–36)
MCV RBC AUTO: 91.4 FL — SIGNIFICANT CHANGE UP (ref 80–100)
MONOCYTES # BLD AUTO: 0.74 K/UL — SIGNIFICANT CHANGE UP (ref 0–0.9)
MONOCYTES NFR BLD AUTO: 8.9 % — SIGNIFICANT CHANGE UP (ref 2–14)
NEUTROPHILS # BLD AUTO: 5.23 K/UL — SIGNIFICANT CHANGE UP (ref 1.8–7.4)
NEUTROPHILS NFR BLD AUTO: 62.9 % — SIGNIFICANT CHANGE UP (ref 43–77)
NRBC # FLD: 0 K/UL — SIGNIFICANT CHANGE UP (ref 0–0)
PLATELET # BLD AUTO: 150 K/UL — SIGNIFICANT CHANGE UP (ref 150–400)
PMV BLD: 12.9 FL — SIGNIFICANT CHANGE UP (ref 7–13)
POTASSIUM SERPL-MCNC: 4.3 MMOL/L — SIGNIFICANT CHANGE UP (ref 3.5–5.3)
POTASSIUM SERPL-SCNC: 4.3 MMOL/L — SIGNIFICANT CHANGE UP (ref 3.5–5.3)
PROT SERPL-MCNC: 7.6 G/DL — SIGNIFICANT CHANGE UP (ref 6–8.3)
RBC # BLD: 4.55 M/UL — SIGNIFICANT CHANGE UP (ref 4.2–5.8)
RBC # FLD: 12.5 % — SIGNIFICANT CHANGE UP (ref 10.3–14.5)
SODIUM SERPL-SCNC: 139 MMOL/L — SIGNIFICANT CHANGE UP (ref 135–145)
TRIGL SERPL-MCNC: 266 MG/DL — HIGH (ref 10–149)
WBC # BLD: 8.33 K/UL — SIGNIFICANT CHANGE UP (ref 3.8–10.5)
WBC # FLD AUTO: 8.33 K/UL — SIGNIFICANT CHANGE UP (ref 3.8–10.5)

## 2019-04-23 PROCEDURE — 73030 X-RAY EXAM OF SHOULDER: CPT | Mod: 26,RT

## 2019-04-23 PROCEDURE — 99214 OFFICE O/P EST MOD 30 MIN: CPT | Mod: GC

## 2019-04-23 RX ORDER — KETOCONAZOLE 20 MG/G
2 CREAM TOPICAL
Qty: 80 | Refills: 2 | Status: DISCONTINUED | COMMUNITY
Start: 2018-08-23 | End: 2019-04-23

## 2019-04-23 RX ORDER — KETOCONAZOLE 20.5 MG/ML
2 SHAMPOO, SUSPENSION TOPICAL
Qty: 1 | Refills: 5 | Status: DISCONTINUED | COMMUNITY
Start: 2018-08-23 | End: 2019-04-23

## 2019-04-23 NOTE — DISCUSSION/SUMMARY
[FreeTextEntry1] : Attempted to call the patient several times regarding his lab results without success.\par \par Lab results mostly normal except triglycerides; left a message stating that most lab results were normal or better than before, but that the triglycerides were elevated and that the patient should be fasting for when he gets his lab work done when he returns to clinic in 6 months.\par \par Sam Cruz\par EM/IM PGY-1

## 2019-04-23 NOTE — HISTORY OF PRESENT ILLNESS
[FreeTextEntry1] : 63yo M presenting for a follow up [de-identified] : 63yo M with PMHx of elevated A1c, HTN, HLD, peripheral neuropathy and erectile dysfunction presenting for follow up. Today he complains of right shoulder pain, which started last week. No trauma. Pain is worse with lifting arm or when sleeping on that side. Takes Tylenol with minimal relief. Pain has been every day.\par \par Pt c/o of hard blister on left thumb that arose two weeks ago. Not painful, but he says it bothers him that it is there. No cuts or other trauma to that area.\par \par Pt c/o pain in bilateral LE around ankles, severe sharp pain with associated bulging of veins. He only has this pain at nighttime when going to bed. He says he took medication for this in the past but can't remember what it was. This pain has been going on 2-3 times per week for past several months. \par \par Patient is following up with dermatology for shingles on bilateral buttocks, has been using Loprox cream since yesterday with some relief. Plans to  PO griseofulvin 500mg BID for this later today.\par \par Interval Hx:\par \par 1) CAD - Pt currently endorses taking all of his medications and denies any severe shortness of breath. He can walk 3-5 blocks before requiring to take a break and can climb 1-2 flights of stairs before needing to stop. He denies any swelling in his legs. He does endorse occasional chest pain that occurs with strenuous activity and resolves with rest.\par \par 2) Erectile Dysfunction - Pt says he continued to not be able to achieve erection. Viagra was stopped at pts last visit with Cards in August, pt will f/u with Cards on 5/2/19 to get approval for this to be renewed.\par \par 3) Peripheral Neuropathy/Diabetes - Pt denies any episodes of increased urination or abdominal pain, nausea or vomiting. He also denies any episodes of fainting of lightheadedness. Pt does endorse continuing pain in his feet with occasional numbness in hands and feet. He states that he takes his gabapentin twice a day.\par \par 4) Insomnia - Pt endorses difficulty sleeping at night, says he sleeps only 30 minutes to an hour each night and is requesting medications for sleep.

## 2019-04-23 NOTE — PLAN
[FreeTextEntry1] : 63yo M with PMHx of peripheral neuropathy, elevated A1C, CAD s/p CABG and 3x stent placement, HTN, HLD, insomnia and ED presenting for follow up\par \par 1) CAD\par - Continue statin and blood pressure medications\par - F/U with cardiology\par - Currently without concerning symptoms. Able to continue ADLs without assistance.\par - Continue aspirin 81mg daily\par \par 2) HLD\par - Continue atorvastatin 80mg daily\par \par 3) Erectile Dysfunction\par - Pt has appt with cardiology for clearance to restart sildenafil\par \par 4) Peripheral Neuropathy\par - Continue gabapentin 600mg TID\par \par 5) Elevated A1C\par - Will re-obtain A1c today\par - Will obtain urine microalbumin today\par - Pt agreed to try to remove sugary drinks and juices and replace with water.\par \par 6) Shoulder pain - likely due to supraspinatous tendinitis\par - Recommended heat, tylenol\par - Will order XR of R shoulder, pt declining ortho referral at this time\par \par 7) Left thumb ganglion cyst\par - Not painful, expected to be self-limited. Will f/u at next visit.\par \par 8) Insomnia\par - Recommend starting melatonin 3mg qhs\par \par 9) Bilateral ankle pain - likely 2/2 muscle cramps\par - Recommended bathe in warm water at night, extension stretching exercises\par - Follow up at next visit\par \par 10) HCM\par - Flu vaccine given last appt\par - Other vaccines are UTD\par - Pt refuses colonoscopy due to mother having a bad outcome from one. Negative FIT test in Nov 2017, should have FIT test at next visit\par - F/U in clinic in 6 months.\par \par Tyron Elias MD PGY-1\par Case discussed with Dr. White

## 2019-04-23 NOTE — PHYSICAL EXAM
[No Acute Distress] : no acute distress [Well Nourished] : well nourished [Well-Appearing] : well-appearing [Well Developed] : well developed [PERRL] : pupils equal round and reactive to light [Normal Voice/Communication] : normal voice/communication [Normal Oropharynx] : the oropharynx was normal [Normal Outer Ear/Nose] : the outer ears and nose were normal in appearance [EOMI] : extraocular movements intact [No JVD] : no jugular venous distention [Supple] : supple [No Respiratory Distress] : no respiratory distress  [Clear to Auscultation] : lungs were clear to auscultation bilaterally [No Lymphadenopathy] : no lymphadenopathy [Regular Rhythm] : with a regular rhythm [No Accessory Muscle Use] : no accessory muscle use [Normal Rate] : normal rate  [No Carotid Bruits] : no carotid bruits [Normal S1, S2] : normal S1 and S2 [No Abdominal Bruit] : a ~M bruit was not heard ~T in the abdomen [No Edema] : there was no peripheral edema [No Varicosities] : no varicosities [No Palpable Aorta] : no palpable aorta [Soft] : abdomen soft [Non-distended] : non-distended [Non Tender] : non-tender [No Masses] : no abdominal mass palpated [No HSM] : no HSM [Normal Bowel Sounds] : normal bowel sounds [No CVA Tenderness] : no CVA  tenderness [No Rash] : no rash [Normal Gait] : normal gait [No Spinal Tenderness] : no spinal tenderness [Coordination Grossly Intact] : coordination grossly intact [No Focal Deficits] : no focal deficits [None] : no ulcers in either foot were found [] : right foot [___] : left foot points [unfilled] [de-identified] : 2/6 systolic murmur heard loudest in the right upper sternal border. [de-identified] : pain with abduction of right arm, ganglion cyst on left thumb

## 2019-04-23 NOTE — REVIEW OF SYSTEMS
[Chest Pain] : chest pain [Palpitations] : palpitations [Cough] : cough [Dyspnea on Exertion] : dyspnea on exertion [Heartburn] : heartburn [Joint Pain] : joint pain [Negative] : Psychiatric [Fever] : no fever [Chills] : no chills [Fatigue] : no fatigue [Hot Flashes] : no hot flashes [Night Sweats] : no night sweats [Pain] : no pain [Redness] : no redness [Itching] : no itching [Lower Ext Edema] : no lower extremity edema [Orthopena] : no orthopnea [Paroysmal Nocturnal Dyspnea] : no paroysmal nocturnal dyspnea [Shortness Of Breath] : no shortness of breath [Wheezing] : no wheezing [Abdominal Pain] : no abdominal pain [Nausea] : no nausea [Constipation] : no constipation [Vomiting] : no vomiting [Diarrhea] : no diarrhea [Melena] : no melena [Dysuria] : no dysuria [Joint Stiffness] : no joint stiffness [Incontinence] : no incontinence [Hematuria] : no hematuria [Itching] : no itching [Joint Swelling] : no joint swelling [Skin Rash] : no skin rash [Headache] : no headache [Dizziness] : no dizziness [Fainting] : no fainting [Unsteady Walk] : no ataxia [FreeTextEntry9] : Endorses shoulder pain, b/l ankle pain, ganglion cyst on left thumb [de-identified] : numbness of hands and feet

## 2019-04-24 LAB
CREAT UR-MCNC: 127 MG/DL — SIGNIFICANT CHANGE UP
MICROALBUMIN UR-MCNC: < 1.2 MG/DL — SIGNIFICANT CHANGE UP

## 2019-04-25 DIAGNOSIS — M75.90 SHOULDER LESION, UNSPECIFIED, UNSPECIFIED SHOULDER: ICD-10-CM

## 2019-05-02 ENCOUNTER — APPOINTMENT (OUTPATIENT)
Dept: CARDIOLOGY | Facility: HOSPITAL | Age: 64
End: 2019-05-02

## 2019-05-23 ENCOUNTER — APPOINTMENT (OUTPATIENT)
Dept: CARDIOLOGY | Facility: HOSPITAL | Age: 64
End: 2019-05-23

## 2019-06-03 PROBLEM — N52.9 MALE ERECTILE DISORDER: Status: ACTIVE | Noted: 2018-04-30

## 2019-07-18 ENCOUNTER — OUTPATIENT (OUTPATIENT)
Dept: OUTPATIENT SERVICES | Facility: HOSPITAL | Age: 64
LOS: 1 days | Discharge: HOME | End: 2019-07-18

## 2019-07-18 ENCOUNTER — APPOINTMENT (OUTPATIENT)
Dept: CARDIOLOGY | Facility: HOSPITAL | Age: 64
End: 2019-07-18

## 2019-07-18 ENCOUNTER — NON-APPOINTMENT (OUTPATIENT)
Age: 64
End: 2019-07-18

## 2019-07-18 VITALS
RESPIRATION RATE: 16 BRPM | SYSTOLIC BLOOD PRESSURE: 176 MMHG | DIASTOLIC BLOOD PRESSURE: 86 MMHG | OXYGEN SATURATION: 99 % | WEIGHT: 186 LBS | BODY MASS INDEX: 29.19 KG/M2 | HEIGHT: 67 IN | HEART RATE: 59 BPM

## 2019-07-18 DIAGNOSIS — I25.10 ATHEROSCLEROTIC HEART DISEASE OF NATIVE CORONARY ARTERY W/OUT ANGINA PECTORIS: ICD-10-CM

## 2019-07-18 DIAGNOSIS — Z95.1 PRESENCE OF AORTOCORONARY BYPASS GRAFT: Chronic | ICD-10-CM

## 2019-07-18 NOTE — PHYSICAL EXAM
[General Appearance - Well Developed] : well developed [Normal Appearance] : normal appearance [Well Groomed] : well groomed [General Appearance - Well Nourished] : well nourished [No Deformities] : no deformities [General Appearance - In No Acute Distress] : no acute distress [Normal Conjunctiva] : the conjunctiva exhibited no abnormalities [Eyelids - No Xanthelasma] : the eyelids demonstrated no xanthelasmas [Normal Oral Mucosa] : normal oral mucosa [No Oral Pallor] : no oral pallor [No Oral Cyanosis] : no oral cyanosis [Normal Jugular Venous A Waves Present] : normal jugular venous A waves present [Normal Jugular Venous V Waves Present] : normal jugular venous V waves present [No Jugular Venous Hawthorne A Waves] : no jugular venous hawthorne A waves [Respiration, Rhythm And Depth] : normal respiratory rhythm and effort [Exaggerated Use Of Accessory Muscles For Inspiration] : no accessory muscle use [Auscultation Breath Sounds / Voice Sounds] : lungs were clear to auscultation bilaterally [Heart Rate And Rhythm] : heart rate and rhythm were normal [Heart Sounds] : normal S1 and S2 [Murmurs] : no murmurs present [Abdomen Soft] : soft [Abdomen Tenderness] : non-tender [Abdomen Mass (___ Cm)] : no abdominal mass palpated [Abnormal Walk] : normal gait [Gait - Sufficient For Exercise Testing] : the gait was sufficient for exercise testing [Nail Clubbing] : no clubbing of the fingernails [Cyanosis, Localized] : no localized cyanosis [Petechial Hemorrhages (___cm)] : no petechial hemorrhages [Skin Color & Pigmentation] : normal skin color and pigmentation [] : no rash [No Venous Stasis] : no venous stasis [Skin Lesions] : no skin lesions [No Skin Ulcers] : no skin ulcer [No Xanthoma] : no  xanthoma was observed [Oriented To Time, Place, And Person] : oriented to person, place, and time [Affect] : the affect was normal [Mood] : the mood was normal [No Anxiety] : not feeling anxious

## 2019-07-18 NOTE — REVIEW OF SYSTEMS
[see HPI] : see HPI [Negative] : Heme/Lymph [Shortness Of Breath] : no shortness of breath [Chest Pain] : no chest pain

## 2019-07-18 NOTE — HISTORY OF PRESENT ILLNESS
[FreeTextEntry1] : 63 year old man PMH HTN, HLD, pre-diabetes (HbA1c 6.2), CAD s/p MI 3V CABG 99, [facility] s/p PCI 8/2018 (patent SVG to OM2 and SVG to D1; PCI w OLYA to mRCA, pRCA; LIMA atretic w mild diffuse disease) presenting for follow-up. Patient denies any new chest pain, dyspnea, and is able to walk 2-3 blocks and 1-2 flights of stairs without any symptoms. Denies any extremity swelling, weight gain, orthopnea, PND suggestive of decompensated CHF. He is adherent with his DAPT and his cardiac medications all of which he takes nightly. He does have a blood pressure device at home but does not measure his BP regularly. He does not have exercise. \par \par Cardiac Data: \par \par Lipid Panel (4/2019): Tot Chol 176, Trig 266, HDL 46,  on atorvastatin 80mg \par \par ECG (8/2018):   sinus, incomplete RBBB\par \par [facility] (8/2018) : \par CORONARY VESSELS: LAD: The distal vessel was supplied by retrograde flow from\par the graft(s) to the mid LAD. The LIMA to LAD is atretic.\par DIAGNOSTIC IMPRESSIONS: Successful PCI to severe stenosis of proximal and mid\par RCA using Synergy OLYA (3 x stents)\par Patent SVG to OM-2, Patent SVG to D-1\par LIMA to LAD is atretic, native LAD proximally has severe stenosis that is worse\par compared to last cath, and this is impeding retrograde flow from D-1 into LAD.\par DIAGNOSTIC RECOMMENDATIONS: DAPT x 12 months\par Aggressive risk factor modification\par Plan for PCI to LAD , if patient has refractory anginal symptoms despite medical therapy \par INTERVENTIONAL IMPRESSIONS: Successful PCI to severe stenosis of proximal and mid RCA using Synergy OLYA (3 x stents) Patent SVG to OM-2, Patent SVG to D-1 LIMA to LAD is atretic, native LAD proximally has severe stenosis that is worse compared to last cath, and this is impeding retrograde flow from D-1 into LAD.\par

## 2019-07-18 NOTE — DISCUSSION/SUMMARY
[Patient] : the patient [___ Month(s)] : [unfilled] month(s) [FreeTextEntry1] : 63 year old man with PMH HTN, HLD, pre-diabetes, HTN, CAD s/p CABG 3V' 99, C s/p PCI 8/2018 here for regular f/u.\par \par 1. CAD: s/p cath 8/2018 which showed severe prox and mid RCA stenosis with OLYA x3, patentSVG to OM-2, Patent SVG to D-1, LIMA to LAD is atretic, native LAD proximally has severe stenosis that is worse compared to last cath, and with impeding retrograde flow from D-1 into LAD. Patient does not have any recurrent anginal symptoms on current medical therapy. However, his last lipid panel LDL has elevated to 112 <-86 while on Atorvastatin 80 daily will add Zetia 10 mg daily for more aggressive lipid management with goal LDL < 70\par - cont ASA, and Plavix for 12 month until 8/2019\par - cont lisinopril 10\par - cont atorvastatin 80, added Zetia 10 mg daily today. Repeat lipid panel in 3 months. \par - continue toprol  mg daily\par - HbA1c 6.2, continue to trend this and continue to encourage lifestyle modifications. \par \par 2. HLD: continues to have elevated TG and suboptimal LDL on 4/2019 labs. \par - cont atorvastatin 80, added Zetia 10 mg daily today. Repeat lipid panel in 3 months. \par - discussed lifestyle modifications, but patient does not seem willing to make changes at this time. \par \par 3. HTN: BP elevated to 175/80 initially which is likely erroneous, repeated and obtain 135/80. Patient takes all his medications nightly and has not taken his BP medications yet. Usually BP is well controlled ~120/70. \par - Cont lisinopril, metoprolol at current doses \par - Advised ambulatory BP monitoring at home. \par \par **physically inappropriate at prior visit. Will continue to follow this patient. Please only schedule w male physicians.

## 2019-07-19 DIAGNOSIS — E78.5 HYPERLIPIDEMIA, UNSPECIFIED: ICD-10-CM

## 2019-08-09 ENCOUNTER — OUTPATIENT (OUTPATIENT)
Dept: OUTPATIENT SERVICES | Facility: HOSPITAL | Age: 64
LOS: 1 days | End: 2019-08-09

## 2019-08-09 ENCOUNTER — LABORATORY RESULT (OUTPATIENT)
Age: 64
End: 2019-08-09

## 2019-08-09 ENCOUNTER — APPOINTMENT (OUTPATIENT)
Dept: INTERNAL MEDICINE | Facility: CLINIC | Age: 64
End: 2019-08-09
Payer: COMMERCIAL

## 2019-08-09 VITALS — BODY MASS INDEX: 27.47 KG/M2 | WEIGHT: 175 LBS | HEART RATE: 65 BPM | OXYGEN SATURATION: 98 % | HEIGHT: 67 IN

## 2019-08-09 VITALS — SYSTOLIC BLOOD PRESSURE: 118 MMHG | DIASTOLIC BLOOD PRESSURE: 64 MMHG

## 2019-08-09 DIAGNOSIS — Z95.1 PRESENCE OF AORTOCORONARY BYPASS GRAFT: Chronic | ICD-10-CM

## 2019-08-09 DIAGNOSIS — R63.4 ABNORMAL WEIGHT LOSS: ICD-10-CM

## 2019-08-09 LAB
HBA1C BLD-MCNC: 6.2 % — HIGH (ref 4–5.6)
T4 FREE SERPL-MCNC: 0.9 NG/DL — SIGNIFICANT CHANGE UP (ref 0.9–1.8)
TSH SERPL-MCNC: 3.01 UIU/ML — SIGNIFICANT CHANGE UP (ref 0.27–4.2)

## 2019-08-09 PROCEDURE — 99214 OFFICE O/P EST MOD 30 MIN: CPT | Mod: GC

## 2019-08-09 NOTE — PHYSICAL EXAM
[No Acute Distress] : no acute distress [Well Nourished] : well nourished [Well Developed] : well developed [PERRL] : pupils equal round and reactive to light [Normal Oropharynx] : the oropharynx was normal [No JVD] : no jugular venous distention [No Lymphadenopathy] : no lymphadenopathy [No Respiratory Distress] : no respiratory distress  [Supple] : supple [No Accessory Muscle Use] : no accessory muscle use [Clear to Auscultation] : lungs were clear to auscultation bilaterally [Regular Rhythm] : with a regular rhythm [Normal Rate] : normal rate  [Normal S1, S2] : normal S1 and S2 [No Abdominal Bruit] : a ~M bruit was not heard ~T in the abdomen [No Edema] : there was no peripheral edema [Soft] : abdomen soft [Non Tender] : non-tender [Non-distended] : non-distended [Normal Bowel Sounds] : normal bowel sounds [No CVA Tenderness] : no CVA  tenderness [No Spinal Tenderness] : no spinal tenderness [No Joint Swelling] : no joint swelling

## 2019-08-12 ENCOUNTER — RESULT REVIEW (OUTPATIENT)
Age: 64
End: 2019-08-12

## 2019-08-12 DIAGNOSIS — G47.00 INSOMNIA, UNSPECIFIED: ICD-10-CM

## 2019-08-12 DIAGNOSIS — R63.4 ABNORMAL WEIGHT LOSS: ICD-10-CM

## 2019-08-12 NOTE — HISTORY OF PRESENT ILLNESS
[FreeTextEntry1] : Patient is a 63yo M here for follow up. [de-identified] : Patient is a 65yo M (states he is actually 67 but paperwork reflects 64), PMHx of elevated A1C, HTN, HLD, ED, peripheral neuropathy, carapal tunnel, CAD here for a follow up. \par \par Fatigue: Patient has a history of insomnia. Patient stated that he is still having trouble sleeping, even though he is taking melatonin 3mg qHS, sleeping between 30 minutes and 90 minutes per night. Patient states that he has lots of thoughts in his head throughout the night that are keeping him awake as he has a lot of pressure in his life. Patient would like to take a stronger medication to help him sleep. Patient states that he is an anxious person and is anxious about his family, his business, his home, the "state of the world". Patient states that he is feeling depressed most days of the week. Patient has been eating less than usual because he is not hungry most of the time, patient has lost 11 pounds since July 18th visit. Endorses feelings of guilt. Denies SI. Patient does not want to see behavioral health, but states that he has just been having trouble with his family. Patient did not want to elaborate on this. \par \par Patient denies family history of thyroid issues.\par \par Pain in joints: Patient states that he has been having pain in his joints in his hands for the past few years, states that recently it has been a bit worse than usual. Patient takes gabapentin, but feels that it does not help much. Patient takes herbal medicine for his carpal tunnel syndrome and feels that it is well controlled. \par \par Chest pain: Patient has been having on and off chest pain for the past few years, it has not been any worse than usual lately. Patient has history of triple bypass in 1999, as well as a few catheterizations over the years, latest 08/2018. Patient follows with cardiology regularly, last saw them 07/18/2019.

## 2019-08-12 NOTE — REVIEW OF SYSTEMS
[Fatigue] : fatigue [Recent Change In Weight] : ~T recent weight change [Joint Pain] : joint pain [Anxiety] : anxiety [Depression] : depression [Fever] : no fever [Vision Problems] : no vision problems [Pain] : no pain [Chills] : no chills [Nasal Discharge] : no nasal discharge [Postnasal Drip] : no postnasal drip [Sore Throat] : no sore throat [Chest Pain] : no chest pain [Lower Ext Edema] : no lower extremity edema [Palpitations] : no palpitations [Cough] : no cough [Wheezing] : no wheezing [Shortness Of Breath] : no shortness of breath [Abdominal Pain] : no abdominal pain [Nausea] : no nausea [Constipation] : no constipation [Diarrhea] : no diarrhea [Vomiting] : no vomiting [Heartburn] : no heartburn [Dysuria] : no dysuria [Hematuria] : no hematuria [Headache] : no headache [Dizziness] : no dizziness [Fainting] : no fainting [FreeTextEntry5] : denies current CP or palpitations. [de-identified] : patient has infrequent headaches, also has episodes of dizziness if he stands up quickly from sitting

## 2019-08-12 NOTE — ASSESSMENT
[FreeTextEntry1] : Patient is a 63yo M (states he is actually 67 but paperwork reflects 64), PMHx of elevated A1C, HTN, HLD, ED, carpal tunnel, peripheral neuropathy, CAD here for a follow up. \par \par --Insomnia: Patient has been more tired than usual, sleeping only 30-90 minutes a night. Patient was given melatonin 3mg qHS on last visit but it hasn't helped. Patient endorses symptoms of depression and anxiety. Patient prescribed Trazodone today. Patient does not want to see psychiatry. Follow up with any provider in 1 month for insomnia. \par \par --Elevated A1C: Rechecked A1C today.\par \par --Weight loss: Patient lost 11 pounds in the past month. Patient denies family history of thyroid disease. Labs done today: TSH, T4. Patient given FIT test. \par \par --Joint pain: Patient endorses joint pain for the past few years, takes gabapentin for carpal tunnel, helps a bit when he takes it with herbal supplement. Patient given referral for rheumatology due to increased joint pain. \par \par --CAD: No acute complaints today- denies CP,SOB. Patient follows with cardiology regularly, last saw them 07/18/2019, will follow up with cardiology as necessary.\par \par --HCM: Patient stated that he does not want a colonoscopy but will reconsider after FIT test results. \par \par Case discussed with Dr. Weems

## 2019-08-26 ENCOUNTER — APPOINTMENT (OUTPATIENT)
Dept: RHEUMATOLOGY | Facility: CLINIC | Age: 64
End: 2019-08-26

## 2019-09-06 ENCOUNTER — OTHER (OUTPATIENT)
Age: 64
End: 2019-09-06

## 2019-09-10 ENCOUNTER — CHART COPY (OUTPATIENT)
Age: 64
End: 2019-09-10

## 2019-09-11 ENCOUNTER — APPOINTMENT (OUTPATIENT)
Dept: INTERNAL MEDICINE | Facility: CLINIC | Age: 64
End: 2019-09-11

## 2019-10-02 ENCOUNTER — APPOINTMENT (OUTPATIENT)
Dept: INTERNAL MEDICINE | Facility: CLINIC | Age: 64
End: 2019-10-02

## 2019-11-14 ENCOUNTER — OUTPATIENT (OUTPATIENT)
Dept: OUTPATIENT SERVICES | Facility: HOSPITAL | Age: 64
LOS: 1 days | End: 2019-11-14

## 2019-11-14 ENCOUNTER — LABORATORY RESULT (OUTPATIENT)
Age: 64
End: 2019-11-14

## 2019-11-14 ENCOUNTER — APPOINTMENT (OUTPATIENT)
Dept: INTERNAL MEDICINE | Facility: CLINIC | Age: 64
End: 2019-11-14
Payer: MEDICARE

## 2019-11-14 VITALS
WEIGHT: 183 LBS | DIASTOLIC BLOOD PRESSURE: 80 MMHG | HEART RATE: 66 BPM | SYSTOLIC BLOOD PRESSURE: 118 MMHG | HEIGHT: 67 IN | BODY MASS INDEX: 28.72 KG/M2

## 2019-11-14 DIAGNOSIS — Z95.1 PRESENCE OF AORTOCORONARY BYPASS GRAFT: Chronic | ICD-10-CM

## 2019-11-14 DIAGNOSIS — R73.09 OTHER ABNORMAL GLUCOSE: ICD-10-CM

## 2019-11-14 LAB — HBA1C BLD-MCNC: 6.1 % — HIGH (ref 4–5.6)

## 2019-11-14 PROCEDURE — 99214 OFFICE O/P EST MOD 30 MIN: CPT | Mod: GC

## 2019-11-15 ENCOUNTER — OTHER (OUTPATIENT)
Age: 64
End: 2019-11-15

## 2019-11-15 NOTE — PHYSICAL EXAM
[No Acute Distress] : no acute distress [Well Nourished] : well nourished [Well Developed] : well developed [Well-Appearing] : well-appearing [EOMI] : extraocular movements intact [No JVD] : no jugular venous distention [Normal Outer Ear/Nose] : the outer ears and nose were normal in appearance [Normal Rate] : normal rate  [No Lymphadenopathy] : no lymphadenopathy [Regular Rhythm] : with a regular rhythm [No Murmur] : no murmur heard [Normal S1, S2] : normal S1 and S2 [No Abdominal Bruit] : a ~M bruit was not heard ~T in the abdomen [No Carotid Bruits] : no carotid bruits [Soft] : abdomen soft [Non Tender] : non-tender [Non-distended] : non-distended [Coordination Grossly Intact] : coordination grossly intact [No Focal Deficits] : no focal deficits [Normal Gait] : normal gait [Normal Affect] : the affect was normal

## 2019-11-15 NOTE — HISTORY OF PRESENT ILLNESS
[FreeTextEntry1] : Follow up  [de-identified] : -------------------------------------------------------------------------------\par \par Provider Contacts:\par - Cardiology: Desmond House\par 	\par -------------------------------------------------------------------------------\par HPI:\par \par \par 65 yo man w/ pre-DM, HTN, HLD, CAD (s/p CABG 1999, multiple PCI, last 8/2018 with 3 OLYA), ED, carpal tunnel, peripheral neuropathy here for follow up \par \par Insomnia: \par - Pt continues to endorse insomnia which he believes is secondary to anxity\par - Prescribed Trazadone last visit but never picked up \par \par CAD: \par - Follows with Dr. House, last seen 2019-Jul\par - No CP, SOB, PND, Orthopnea\par - Exercise tolerace: 2 blocks\par \par \par HCM: \par \par Vaccines\par - Influenza: 18 Sep 2018\par - Td/Tdap: 18 Sep 2010, repeat provided 2019-Nov-14\par - HZV: 06 Jul 2016\par - PPSV-23: 2019-Nov-14 \par \par Screening: \par - AAA US: 2019-Nov-14 referral provided\par - Depression: PHQ2=0 2019-Nov-14 \par - ASCVD (40-75 ever 5 yrs): 14.03%  \par \par Infectious Disease: \par - HCV: 01 Oct 2014; Negative\par - TB: \par \par Cancer:\par - Colon (50-75): 06 Sep 2019, FIT reordered 2019-Nov-14 \par - Lung: CT scan ordered 2019-Nov-14\par \par Diabetes (annual): \par - HbA1c: 6.2 2019-Aug\par \par -------------------------------------------------------------------------------\par \par Social History:\par - Lives w/: family (wife Jaja Benedict, 2 adult children, 6 grandchildren)\par - Work: retired/working: real estate \par - Country or origin: [facility] [place], moved to  [place]rs ago \par - Children: 2 adult children\par - Relationship(s): Wife\par - Ambulation: without assistance \par - Assistance: none\par - Travel: [facility], last traveled in 2016, trip planned Dec 2019\par - GOC: Pt states that he believes in God and that when his time comes he would not want any aggressive intervention \par \par - Tobacco: 40pk/yr hx, quit 20+ yrs ago\par - EtOH: Never\par - Other: Never\par \par Family History:\par - No DM in parentes or siblings\par - Mother had stroke in 1981\par \par Surgical History:\par - CABG 1991\par \par -------------------------------------------------------------------------------\par

## 2019-11-15 NOTE — HISTORY OF PRESENT ILLNESS
[FreeTextEntry1] : Follow up  [de-identified] : -------------------------------------------------------------------------------\par \par Provider Contacts:\par - Cardiology: Desmond House\par 	\par -------------------------------------------------------------------------------\par HPI:\par \par \par 63 yo man w/ pre-DM, HTN, HLD, CAD (s/p CABG 1999, multiple PCI, last 8/2018 with 3 OLYA), ED, carpal tunnel, peripheral neuropathy here for follow up \par \par Insomnia: \par - Pt continues to endorse insomnia which he believes is secondary to anxity\par - Prescribed Trazadone last visit but never picked up \par \par CAD: \par - Follows with Dr. House, last seen 2019-Jul\par - No CP, SOB, PND, Orthopnea\par - Exercise tolerace: 2 blocks\par \par \par HCM: \par \par Vaccines\par - Influenza: 18 Sep 2018\par - Td/Tdap: 18 Sep 2010, repeat provided 2019-Nov-14\par - HZV: 06 Jul 2016\par - PPSV-23: 2019-Nov-14 \par \par Screening: \par - AAA US: 2019-Nov-14 referral provided\par - Depression: PHQ2=0 2019-Nov-14 \par - ASCVD (40-75 ever 5 yrs): 14.03%  \par \par Infectious Disease: \par - HCV: 01 Oct 2014; Negative\par - TB: \par \par Cancer:\par - Colon (50-75): 06 Sep 2019, FIT reordered 2019-Nov-14 \par - Lung: CT scan ordered 2019-Nov-14\par \par Diabetes (annual): \par - HbA1c: 6.2 2019-Aug\par \par -------------------------------------------------------------------------------\par \par Social History:\par - Lives w/: family (wife Jaja Benedict, 2 adult children, 6 grandchildren)\par - Work: retired/working: real estate \par - Country or origin: [facility], moved to  [place] yrs ago \par - Children: 2 adult children\par - Relationship(s): Wife\par - Ambulation: without assistance \par - Assistance: none\par - Travel: [facility], last traveled in 2016, trip planned Dec 2019\par - GOC: Pt states that he believes in God and that when his time comes he would not want any aggressive intervention \par \par - Tobacco: 40pk/yr hx, quit 20+ yrs ago\par - EtOH: Never\par - Other: Never\par \par Family History:\par - No DM in parentes or siblings\par - Mother had stroke in 1981\par \par Surgical History:\par - CABG 1991\par \par -------------------------------------------------------------------------------\par

## 2019-11-15 NOTE — PHYSICAL EXAM
[No Acute Distress] : no acute distress [Well Nourished] : well nourished [Well Developed] : well developed [Well-Appearing] : well-appearing [EOMI] : extraocular movements intact [No JVD] : no jugular venous distention [Normal Outer Ear/Nose] : the outer ears and nose were normal in appearance [No Lymphadenopathy] : no lymphadenopathy [Normal Rate] : normal rate  [Regular Rhythm] : with a regular rhythm [No Murmur] : no murmur heard [Normal S1, S2] : normal S1 and S2 [No Carotid Bruits] : no carotid bruits [No Abdominal Bruit] : a ~M bruit was not heard ~T in the abdomen [Soft] : abdomen soft [Non Tender] : non-tender [Non-distended] : non-distended [Coordination Grossly Intact] : coordination grossly intact [Normal Gait] : normal gait [No Focal Deficits] : no focal deficits [Normal Affect] : the affect was normal

## 2019-11-15 NOTE — REVIEW OF SYSTEMS
[Fever] : no fever [Chills] : no chills [Fatigue] : no fatigue [Night Sweats] : no night sweats [Recent Change In Weight] : ~T no recent weight change [Discharge] : no discharge [Pain] : no pain [Redness] : no redness [Vision Problems] : no vision problems [Dryness] : no dryness [Itching] : no itching [Earache] : no earache [Hearing Loss] : no hearing loss [Nosebleeds] : no nosebleeds [Nasal Discharge] : no nasal discharge [Postnasal Drip] : no postnasal drip [Sore Throat] : no sore throat [Hoarseness] : no hoarseness [Chest Pain] : no chest pain [Palpitations] : no palpitations [Claudication] : no  leg claudication [Lower Ext Edema] : no lower extremity edema [Orthopena] : no orthopnea [Paroxysmal Nocturnal Dyspnea] : no paroxysmal nocturnal dyspnea [Shortness Of Breath] : no shortness of breath [Wheezing] : no wheezing [Cough] : no cough [Dyspnea on Exertion] : not dyspnea on exertion [Abdominal Pain] : no abdominal pain [Constipation] : no constipation [Nausea] : no nausea [Diarrhea] : no diarrhea [Vomiting] : no vomiting [Heartburn] : no heartburn [Melena] : no melena [Dysuria] : no dysuria [Joint Pain] : no joint pain [Joint Stiffness] : no joint stiffness [Muscle Pain] : no muscle pain [Muscle Weakness] : no muscle weakness [Back Pain] : no back pain [Joint Swelling] : no joint swelling [Skin Rash] : no skin rash [Headache] : no headache [Dizziness] : no dizziness [Fainting] : no fainting [Suicidal] : not suicidal [Depression] : no depression

## 2019-11-15 NOTE — ASSESSMENT
[FreeTextEntry1] : -------------------------------------------------------------------------------\par -----\par HPI:\par \par 63 yo man w/ pre-DM, HTN, HLD, CAD (s/p CABG 1999, multiple PCI, last 8/2018 with 3 OLYA), ED, carpal tunnel, peripheral neuropathy here for follow up \par \par Insomnia: \par - Trazadone reordered \par \par CAD:\par - S/p CABG 1999, Multiple PCI last 2018-Aug \par - Follows with Dr. House, last seen 2019-Jul\par - C/w ASA, and Plavix (per cards notes can probably discontinue Plavix, pt wants to continue for now) \par - C/w lisinopril 10\par - C/w atorvastatin 80, Zetia 10 \par - C/w Toprol  mg daily\par \par HCM: \par \par Vaccines\par - Influenza: 18 Sep 2018\par - Td/Tdap: 18 Sep 2010, repeat provided 2019-Nov-14\par - HZV: 06 Jul 2016\par - PPSV-23: 2019-Nov-14 \par \par Screening: \par - AAA US: 2019-Nov-14 referral provided\par - Depression: PHQ2=0 2019-Nov-14 \par - ASCVD (40-75 ever 5 yrs): 14.03%, on Atorvastatin 80, Zetia 10 \par \par Infectious Disease: \par - HCV: 01 Oct 2014; Negative\par - TB: \par \par Cancer:\par - Colon (50-75): 06 Sep 2019, FIT reordered 2019-Nov-14 \par - Lung: CT scan ordered 2019-Nov-14\par \par Diabetes (annual): \par - HbA1c: 6.2 2019-Aug\par \par -------------------------------------------------------------------------------\par \par Visit Summary: \par - Problems addressed: CAD, HTN, HCM\par - Medication changes: Reordered Trazadone for insomnia\par - Vaccines administered: Influenza, PPSV23, Td\par - Labs ordered: HbA1c\par - Imaging ordered: Abd US for AAA screening, CT chest for lung screen\par - Reminders for next visit: Repeat lipid profile, MOLST form \par \par -------------------------------------------------------------------------------\par \par Return to clinic: 3 months\par \par Patient and plan discussed with : Christopher\par \par Mark Hellerman, MD PGY2\par Internal Medicine \par [facility] [place]\par [phone]

## 2019-11-15 NOTE — REVIEW OF SYSTEMS
[Fever] : no fever [Chills] : no chills [Fatigue] : no fatigue [Night Sweats] : no night sweats [Recent Change In Weight] : ~T no recent weight change [Discharge] : no discharge [Redness] : no redness [Pain] : no pain [Vision Problems] : no vision problems [Dryness] : no dryness [Itching] : no itching [Earache] : no earache [Hearing Loss] : no hearing loss [Nosebleeds] : no nosebleeds [Postnasal Drip] : no postnasal drip [Nasal Discharge] : no nasal discharge [Sore Throat] : no sore throat [Hoarseness] : no hoarseness [Chest Pain] : no chest pain [Palpitations] : no palpitations [Claudication] : no  leg claudication [Lower Ext Edema] : no lower extremity edema [Orthopena] : no orthopnea [Paroxysmal Nocturnal Dyspnea] : no paroxysmal nocturnal dyspnea [Shortness Of Breath] : no shortness of breath [Wheezing] : no wheezing [Cough] : no cough [Dyspnea on Exertion] : not dyspnea on exertion [Abdominal Pain] : no abdominal pain [Constipation] : no constipation [Nausea] : no nausea [Diarrhea] : no diarrhea [Vomiting] : no vomiting [Heartburn] : no heartburn [Melena] : no melena [Dysuria] : no dysuria [Joint Pain] : no joint pain [Joint Stiffness] : no joint stiffness [Muscle Pain] : no muscle pain [Muscle Weakness] : no muscle weakness [Back Pain] : no back pain [Joint Swelling] : no joint swelling [Skin Rash] : no skin rash [Headache] : no headache [Dizziness] : no dizziness [Fainting] : no fainting [Suicidal] : not suicidal [Depression] : no depression

## 2019-11-15 NOTE — ASSESSMENT
[FreeTextEntry1] : -------------------------------------------------------------------------------\par -----\par HPI:\par \par 63 yo man w/ pre-DM, HTN, HLD, CAD (s/p CABG 1999, multiple PCI, last 8/2018 with 3 OLYA), ED, carpal tunnel, peripheral neuropathy here for follow up \par \par Insomnia: \par - Trazadone reordered \par \par CAD:\par - S/p CABG 1999, Multiple PCI last 2018-Aug \par - Follows with Dr. House, last seen 2019-Jul\par - C/w ASA, and Plavix (per cards notes can probably discontinue Plavix, pt wants to continue for now) \par - C/w lisinopril 10\par - C/w atorvastatin 80, Zetia 10 \par - C/w Toprol  mg daily\par \par HCM: \par \par Vaccines\par - Influenza: 18 Sep 2018\par - Td/Tdap: 18 Sep 2010, repeat provided 2019-Nov-14\par - HZV: 06 Jul 2016\par - PPSV-23: 2019-Nov-14 \par \par Screening: \par - AAA US: 2019-Nov-14 referral provided\par - Depression: PHQ2=0 2019-Nov-14 \par - ASCVD (40-75 ever 5 yrs): 14.03%, on Atorvastatin 80, Zetia 10 \par \par Infectious Disease: \par - HCV: 01 Oct 2014; Negative\par - TB: \par \par Cancer:\par - Colon (50-75): 06 Sep 2019, FIT reordered 2019-Nov-14 \par - Lung: CT scan ordered 2019-Nov-14\par \par Diabetes (annual): \par - HbA1c: 6.2 2019-Aug\par \par -------------------------------------------------------------------------------\par \par Visit Summary: \par - Problems addressed: CAD, HTN, HCM\par - Medication changes: Reordered Trazadone for insomnia\par - Vaccines administered: Influenza, PPSV23, Td\par - Labs ordered: HbA1c\par - Imaging ordered: Abd US for AAA screening, CT chest for lung screen\par - Reminders for next visit: Repeat lipid profile, MOLST form \par \par -------------------------------------------------------------------------------\par \par Return to clinic: 3 months\par \par Patient and plan discussed with : Christopher\par \par Mark Hellerman, MD PGY2\par Internal Medicine \par [facility] [place]\par [phone]

## 2019-11-18 DIAGNOSIS — R73.09 OTHER ABNORMAL GLUCOSE: ICD-10-CM

## 2019-11-18 DIAGNOSIS — R73.03 PREDIABETES: ICD-10-CM

## 2019-11-18 DIAGNOSIS — E78.5 HYPERLIPIDEMIA, UNSPECIFIED: ICD-10-CM

## 2019-11-18 DIAGNOSIS — I10 ESSENTIAL (PRIMARY) HYPERTENSION: ICD-10-CM

## 2019-11-18 DIAGNOSIS — Z23 ENCOUNTER FOR IMMUNIZATION: ICD-10-CM

## 2019-12-01 ENCOUNTER — OUTPATIENT (OUTPATIENT)
Dept: OUTPATIENT SERVICES | Facility: HOSPITAL | Age: 64
LOS: 1 days | End: 2019-12-01
Payer: MEDICAID

## 2019-12-01 DIAGNOSIS — Z95.1 PRESENCE OF AORTOCORONARY BYPASS GRAFT: Chronic | ICD-10-CM

## 2019-12-01 PROCEDURE — G9001: CPT

## 2019-12-04 DIAGNOSIS — Z71.89 OTHER SPECIFIED COUNSELING: ICD-10-CM

## 2019-12-10 ENCOUNTER — RESULT REVIEW (OUTPATIENT)
Age: 64
End: 2019-12-10

## 2019-12-24 ENCOUNTER — INBOUND DOCUMENT (OUTPATIENT)
Age: 64
End: 2019-12-24

## 2020-01-29 ENCOUNTER — LABORATORY RESULT (OUTPATIENT)
Age: 65
End: 2020-01-29

## 2020-01-29 ENCOUNTER — APPOINTMENT (OUTPATIENT)
Dept: GASTROENTEROLOGY | Facility: CLINIC | Age: 65
End: 2020-01-29
Payer: MEDICAID

## 2020-01-29 VITALS
RESPIRATION RATE: 15 BRPM | HEIGHT: 67 IN | OXYGEN SATURATION: 97 % | HEART RATE: 67 BPM | DIASTOLIC BLOOD PRESSURE: 60 MMHG | SYSTOLIC BLOOD PRESSURE: 112 MMHG | TEMPERATURE: 97.7 F | WEIGHT: 138 LBS | BODY MASS INDEX: 21.66 KG/M2

## 2020-01-29 DIAGNOSIS — K25.9 GASTRIC ULCER, UNSPECIFIED AS ACUTE OR CHRONIC, W/OUT HEMORRHAGE OR PERFORATION: ICD-10-CM

## 2020-01-29 DIAGNOSIS — D64.9 ANEMIA, UNSPECIFIED: ICD-10-CM

## 2020-01-29 DIAGNOSIS — K64.8 OTHER HEMORRHOIDS: ICD-10-CM

## 2020-01-29 DIAGNOSIS — B96.81 PEPTIC ULCER, SITE UNSPECIFIED, UNSPECIFIED AS ACUTE OR CHRONIC, W/OUT HEMORRHAGE OR PERFORATION: ICD-10-CM

## 2020-01-29 DIAGNOSIS — K27.9 PEPTIC ULCER, SITE UNSPECIFIED, UNSPECIFIED AS ACUTE OR CHRONIC, W/OUT HEMORRHAGE OR PERFORATION: ICD-10-CM

## 2020-01-29 PROCEDURE — 99203 OFFICE O/P NEW LOW 30 MIN: CPT | Mod: 25

## 2020-01-29 PROCEDURE — 36415 COLL VENOUS BLD VENIPUNCTURE: CPT

## 2020-01-29 NOTE — PHYSICAL EXAM
[General Appearance - Alert] : alert [General Appearance - In No Acute Distress] : in no acute distress [Outer Ear] : the ears and nose were normal in appearance [Sclera] : the sclera and conjunctiva were normal [Neck Appearance] : the appearance of the neck was normal [Auscultation Breath Sounds / Voice Sounds] : lungs were clear to auscultation bilaterally [Heart Rate And Rhythm] : heart rate was normal and rhythm regular [Heart Sounds] : normal S1 and S2 [Murmurs] : no murmurs [Heart Sounds Gallop] : no gallops [Heart Sounds Pericardial Friction Rub] : no pericardial rub [Bowel Sounds] : normal bowel sounds [Abdomen Soft] : soft [Abdomen Mass (___ Cm)] : no abdominal mass palpated [Abdomen Tenderness] : non-tender [No CVA Tenderness] : no ~M costovertebral angle tenderness [Involuntary Movements] : no involuntary movements were seen [Skin Color & Pigmentation] : normal skin color and pigmentation [] : no rash [Skin Turgor] : normal skin turgor [No Focal Deficits] : no focal deficits [Affect] : the affect was normal [Impaired Insight] : insight and judgment were intact [Oriented To Time, Place, And Person] : oriented to person, place, and time

## 2020-01-29 NOTE — ASSESSMENT
[FreeTextEntry1] : 43M with CAD (s/p 2 MI in 2010, MI s/p 3 stents in 2016 c/b cardiogenic shock), ICM (EF 35%) on ASA and PLAVIX (no longer Brilinta), HTN and DM, recently admitted at [facility] 11/2019 for dizziness and bloody stools s/p EGD with small, non-bleeding gastric ulcers (possible source) with HP+ gastritis as well as IH.\par \par #HP gastritis c/b gastric ulcers: Patient reports receiving Rx for triple therapy\par --HP stool testing off PPI (instructed to continue PPI for additional 2-3 weeks and then discontinue x at least 2 weeks prior to stool testing)\par --Will recheck CBC, iron studies; per his OSH labs, his blood count increased to 9s post-discharge (as noted above)\par \par #Anemia, post-hemorrhagic: No further evidence of overt bleeding\par --Repeat CBC, iron studies, as above\par --Iron supplementation if needed based on above labs\par \par #CRC screening: Underwent colonoscopy while inpatient 12/2019 for anemia/bloody stools. No polyps identified. \par --Repeat colonoscopy at age 48 for screening; earlier as dictated by clinical events or family history.

## 2020-01-29 NOTE — HISTORY OF PRESENT ILLNESS
[FreeTextEntry1] : 43M with CAD (s/p 2 MI in 2010, MI s/p 3 stents in 2016 c/b cardiogenic shock), ICM (EF 35%) on ASA and PLAVIX (no longer Brilinta), HTN and DM, recently admitted at [facility] 11/2019 for dizziness and bloody stools s/p EGD with small, non-bleeding gastric ulcers (possible source) with HP+ gastritis as well as IH.\par \par Reports feeling much better since discharge. Stool now back to normal. Having daily non-bloody, non-melenic BM. Reports good appetite. States lost weight prior to hospitalization and has not yet gained it back. No nausea, vomiting. No abdominal pain, GERD, dysphagia. No CP, SOB. Still taking pantoprazole daily. \par \par Patient completed HP therapy. He was given Rx for Amoxicillin, Clarithromycin, and PPI x 14 days (completed ~ 5 weeks ago). \par \par 12/7\par CBC: 4.7 > 9.7 < 190\par \par Most recent Hgb 8.6 (12/4/2019).\par \par EGD 12/2/2019\par Impression:     \par - Small, non-bleeding gastric ulcers with no stigmata of bleeding. Likely source of patient's anemia and dark stools.\par - Gastritis and Duodenitis. Biopsies taken for H pylori (antrum, body).\par \par Colonoscopy: 12/2/2019\par Impression:\par - Non-bleeding internal hemorrhoids. Potential source of hematochezia.\par - The examined portion of the ileum was normal.\par - No specimens collected. \par

## 2020-02-13 ENCOUNTER — APPOINTMENT (OUTPATIENT)
Dept: CARDIOLOGY | Facility: HOSPITAL | Age: 65
End: 2020-02-13

## 2020-02-25 ENCOUNTER — APPOINTMENT (OUTPATIENT)
Dept: INTERNAL MEDICINE | Facility: CLINIC | Age: 65
End: 2020-02-25

## 2020-04-28 ENCOUNTER — APPOINTMENT (OUTPATIENT)
Dept: INTERNAL MEDICINE | Facility: CLINIC | Age: 65
End: 2020-04-28

## 2020-05-13 ENCOUNTER — APPOINTMENT (OUTPATIENT)
Dept: INTERNAL MEDICINE | Facility: CLINIC | Age: 65
End: 2020-05-13

## 2020-06-15 ENCOUNTER — APPOINTMENT (OUTPATIENT)
Dept: INTERNAL MEDICINE | Facility: CLINIC | Age: 65
End: 2020-06-15

## 2020-06-22 ENCOUNTER — OUTPATIENT (OUTPATIENT)
Dept: OUTPATIENT SERVICES | Facility: HOSPITAL | Age: 65
LOS: 1 days | End: 2020-06-22

## 2020-06-22 ENCOUNTER — APPOINTMENT (OUTPATIENT)
Dept: INTERNAL MEDICINE | Facility: CLINIC | Age: 65
End: 2020-06-22
Payer: MEDICARE

## 2020-06-22 VITALS
HEIGHT: 67 IN | DIASTOLIC BLOOD PRESSURE: 78 MMHG | HEART RATE: 65 BPM | BODY MASS INDEX: 27.94 KG/M2 | WEIGHT: 178 LBS | SYSTOLIC BLOOD PRESSURE: 134 MMHG

## 2020-06-22 DIAGNOSIS — Z87.09 PERSONAL HISTORY OF OTHER DISEASES OF THE RESPIRATORY SYSTEM: ICD-10-CM

## 2020-06-22 DIAGNOSIS — E78.5 HYPERLIPIDEMIA, UNSPECIFIED: ICD-10-CM

## 2020-06-22 DIAGNOSIS — Z95.1 PRESENCE OF AORTOCORONARY BYPASS GRAFT: Chronic | ICD-10-CM

## 2020-06-22 DIAGNOSIS — Z00.00 ENCOUNTER FOR GENERAL ADULT MEDICAL EXAMINATION W/OUT ABNORMAL FINDINGS: ICD-10-CM

## 2020-06-22 DIAGNOSIS — Z01.810 ENCOUNTER FOR PREPROCEDURAL CARDIOVASCULAR EXAMINATION: ICD-10-CM

## 2020-06-22 DIAGNOSIS — L03.012 CELLULITIS OF LEFT FINGER: ICD-10-CM

## 2020-06-22 PROCEDURE — ZZZZZ: CPT

## 2020-06-22 RX ORDER — CLOPIDOGREL BISULFATE 75 MG/1
75 TABLET, FILM COATED ORAL
Qty: 90 | Refills: 1 | Status: DISCONTINUED | COMMUNITY
Start: 2018-09-20 | End: 2020-06-22

## 2020-06-25 PROBLEM — Z01.810 PREOPERATIVE CARDIOVASCULAR EXAMINATION: Status: RESOLVED | Noted: 2017-03-08 | Resolved: 2020-06-25

## 2020-06-25 PROBLEM — L03.012 CELLULITIS OF FINGER OF LEFT HAND: Status: RESOLVED | Noted: 2018-01-12 | Resolved: 2020-06-25

## 2020-06-25 PROBLEM — Z87.09 HISTORY OF SINUSITIS: Status: RESOLVED | Noted: 2017-11-06 | Resolved: 2020-06-25

## 2020-06-25 NOTE — PLAN
[FreeTextEntry1] : Vaccines\par - Influenza: n/a\par - Td/Tdap: 18 Sep 2010, repeat provided 2019-Nov-14\par - HZV: 06 Jul 2016\par - PPSV-23: 2019-Nov-14 \par \par Screening: \par - AAA US: 2019-Nov-14 referral provided\par - Depression: PHQ2=0 2020-June-22\par \par \par Discussed with Dr. Jones\par  PGY2 [employer]

## 2020-06-25 NOTE — HISTORY OF PRESENT ILLNESS
[Home] : at home, [unfilled] , at the time of the visit. [Verbal consent obtained from patient] : the patient, [unfilled] [FreeTextEntry1] : Follow up for chronic issues [de-identified] : 64 yo male w/ pre-DM, HTN, HLD, CAD (s/p CABG 1999, multiple PCI, last 8/2018 with 3 OLYA), ED, carpal tunnel, peripheral neuropathy here for follow up of chronic issues.\par \par Peripheral Neuropathy- continues to experience intermittent foot and hand numbness. Patient reports only taking gabapentin once daily as he wasn't aware that it was prescribed as three times per day.\par \par Insomnia-Patient continues to endorse.He was prescribed trazodone but only took for 3 days "because it wasn't working.' Reported generalized worrying/thinking when trying to fall asleep.\par \par Of note patient was hospitalized in November with GI bleed, found to have HP+gastritis. Completed tx.

## 2020-06-25 NOTE — PHYSICAL EXAM
[No Acute Distress] : no acute distress [No Respiratory Distress] : no respiratory distress  [Speech Grossly Normal] : speech grossly normal [Alert and Oriented x3] : oriented to person, place, and time [Normal Mood] : the mood was normal [Normal Insight/Judgement] : insight and judgment were intact [de-identified] : Limited by telephonic encounter

## 2020-06-25 NOTE — HEALTH RISK ASSESSMENT
[No] : No [30 or more] : 30 or more [0] : 2) Feeling down, depressed, or hopeless: Not at all (0) [de-identified] : 40pack year history, quit >20 years ago

## 2020-06-26 DIAGNOSIS — G47.00 INSOMNIA, UNSPECIFIED: ICD-10-CM

## 2020-06-26 DIAGNOSIS — I25.10 ATHEROSCLEROTIC HEART DISEASE OF NATIVE CORONARY ARTERY WITHOUT ANGINA PECTORIS: ICD-10-CM

## 2020-06-26 DIAGNOSIS — I10 ESSENTIAL (PRIMARY) HYPERTENSION: ICD-10-CM

## 2020-06-26 DIAGNOSIS — E78.5 HYPERLIPIDEMIA, UNSPECIFIED: ICD-10-CM

## 2020-06-26 DIAGNOSIS — A04.8 OTHER SPECIFIED BACTERIAL INTESTINAL INFECTIONS: ICD-10-CM

## 2020-06-26 DIAGNOSIS — R73.03 PREDIABETES: ICD-10-CM

## 2020-11-18 ENCOUNTER — OUTPATIENT (OUTPATIENT)
Dept: OUTPATIENT SERVICES | Facility: HOSPITAL | Age: 65
LOS: 1 days | End: 2020-11-18

## 2020-11-18 ENCOUNTER — APPOINTMENT (OUTPATIENT)
Dept: CARDIOLOGY | Facility: CLINIC | Age: 65
End: 2020-11-18
Payer: MEDICARE

## 2020-11-18 ENCOUNTER — APPOINTMENT (OUTPATIENT)
Dept: INTERNAL MEDICINE | Facility: CLINIC | Age: 65
End: 2020-11-18
Payer: MEDICARE

## 2020-11-18 ENCOUNTER — NON-APPOINTMENT (OUTPATIENT)
Age: 65
End: 2020-11-18

## 2020-11-18 VITALS
RESPIRATION RATE: 14 BRPM | HEART RATE: 60 BPM | DIASTOLIC BLOOD PRESSURE: 70 MMHG | SYSTOLIC BLOOD PRESSURE: 149 MMHG | OXYGEN SATURATION: 99 % | BODY MASS INDEX: 28.25 KG/M2 | TEMPERATURE: 97 F | HEIGHT: 67 IN | WEIGHT: 180 LBS

## 2020-11-18 VITALS
OXYGEN SATURATION: 99 % | HEIGHT: 67 IN | WEIGHT: 180 LBS | HEART RATE: 63 BPM | BODY MASS INDEX: 28.25 KG/M2 | SYSTOLIC BLOOD PRESSURE: 122 MMHG | DIASTOLIC BLOOD PRESSURE: 80 MMHG

## 2020-11-18 VITALS — TEMPERATURE: 96.8 F

## 2020-11-18 DIAGNOSIS — G62.9 POLYNEUROPATHY, UNSPECIFIED: ICD-10-CM

## 2020-11-18 DIAGNOSIS — R73.03 PREDIABETES.: ICD-10-CM

## 2020-11-18 DIAGNOSIS — G47.00 INSOMNIA, UNSPECIFIED: ICD-10-CM

## 2020-11-18 DIAGNOSIS — E78.5 HYPERLIPIDEMIA, UNSPECIFIED: ICD-10-CM

## 2020-11-18 DIAGNOSIS — M25.50 PAIN IN UNSPECIFIED JOINT: ICD-10-CM

## 2020-11-18 DIAGNOSIS — Z95.1 PRESENCE OF AORTOCORONARY BYPASS GRAFT: Chronic | ICD-10-CM

## 2020-11-18 PROCEDURE — 99072 ADDL SUPL MATRL&STAF TM PHE: CPT

## 2020-11-18 PROCEDURE — 99214 OFFICE O/P EST MOD 30 MIN: CPT | Mod: GC

## 2020-11-18 PROCEDURE — 99213 OFFICE O/P EST LOW 20 MIN: CPT

## 2020-11-18 PROCEDURE — 93000 ELECTROCARDIOGRAM COMPLETE: CPT

## 2020-11-18 RX ORDER — TRAZODONE HYDROCHLORIDE 50 MG/1
50 TABLET ORAL
Qty: 90 | Refills: 3 | Status: DISCONTINUED | COMMUNITY
Start: 2019-08-09 | End: 2020-11-18

## 2020-11-19 ENCOUNTER — LABORATORY RESULT (OUTPATIENT)
Age: 65
End: 2020-11-19

## 2020-11-19 DIAGNOSIS — E78.5 HYPERLIPIDEMIA, UNSPECIFIED: ICD-10-CM

## 2020-11-19 DIAGNOSIS — R73.03 PREDIABETES: ICD-10-CM

## 2020-11-19 DIAGNOSIS — I25.10 ATHEROSCLEROTIC HEART DISEASE OF NATIVE CORONARY ARTERY WITHOUT ANGINA PECTORIS: ICD-10-CM

## 2020-11-19 DIAGNOSIS — I20.9 ANGINA PECTORIS, UNSPECIFIED: ICD-10-CM

## 2020-11-19 DIAGNOSIS — G47.00 INSOMNIA, UNSPECIFIED: ICD-10-CM

## 2020-11-19 DIAGNOSIS — M25.50 PAIN IN UNSPECIFIED JOINT: ICD-10-CM

## 2020-11-19 DIAGNOSIS — A04.8 OTHER SPECIFIED BACTERIAL INTESTINAL INFECTIONS: ICD-10-CM

## 2020-11-19 DIAGNOSIS — I10 ESSENTIAL (PRIMARY) HYPERTENSION: ICD-10-CM

## 2020-11-19 DIAGNOSIS — G62.9 POLYNEUROPATHY, UNSPECIFIED: ICD-10-CM

## 2020-11-19 NOTE — HISTORY OF PRESENT ILLNESS
[FreeTextEntry1] : "I'm traveling soon" [de-identified] : 64 yo male w/ pre-DM (A1C 6.1 - 11/2019), HTN, HLD, CAD (s/p CABG 1999, multiple PCI, last 8/2018 with 3 OLYA), ED, carpal tunnel, peripheral neuropathy here for travel clearance to [facility].\par \par Travel clearance - Patient plans to fly to [facility] pending travel advisories, is aware that COVID swab tests not available at clinic. Reviewed need for vaccines, prevention of Dengue fever, malaria, and water contamination. \par \par Insomnia - continues to have trouble initiating sleep, reports going to bed from 11p thru 5am. Attempted trial of trazodone and melatonin with no improvement. Attributes struggles to initiate sleep throughout life. 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 Discussed sleep hygiene and age-related sleep changes. \par \par Joint Pain & Peripheral Neuropathy - continues to experience intermittent foot and hand joint pain. Also reports burning on anterior sides of feet. As per telehealth appt in June, Patient continues to take gabapentin once at night, rather than 3 times a day as prescribed.\par \par Stable Angina - continues with intermittent chest pain lasting 5-10 seconds occurs 1-2x week for the past few years since CABG.\par \par HCM - received Flu vaccine via pharmacy last week. Completed normal colonoscopy in 2019 during GIB/H pylori workup. UTD with TDap, HZV, and PPSV23 vaccines. PHQ2 score of 0.

## 2020-11-19 NOTE — ASSESSMENT
[FreeTextEntry1] : 64 yo male w/ pre-DM (A1C 6.1 - 11/2019), HTN, HLD, CAD (s/p CABG 1999, multiple PCI, last 8/2018 with 3 OLYA), ED, carpal tunnel, peripheral neuropathy here for travel clearance to [facility].\par \par #Travel clearance to [facility]\par -advised to complete COVID swab test at another facility (eg urgent care) as test is not available at clinic\par -prescribed Hep A and Hep B vaccine. Hep B vaccine not in records. \par -educated on protection against mosquitoes given risk of Dengue fever and malaria\par -educated to avoid water contamination\par \par #Sleep disturbances\par -chronic issues with initiating sleep, most likely age-related changes. Energy at baseline, does not intefere with daily activities, denies mood symptoms\par -encouraged good sleep hygiene practices \par -consider behavioral health if mood issues arise\par \par #Joint Pain & Peripheral Neuropathy \par -educated to take gabapentin 600mg TID as prescribed \par -reassess at next visit\par -follow A1C\par \par #H. pylori infection\par -s/p triple therapy \par -follow HP stool Ag to confirm resolution\par \par #Stable Angina \par -c/w isosorbide mononitrate ER 30mg\par \par #HCM\par -received Flu vaccine via pharmacy last week\par -completed normal colonoscopy in 2019 during GIB/H pylori workup\par -UTD with TDap, HZV, and PPSV23 vaccines\par -PHQ2 score of 0. \par \par \par Susu Louis, PGY1\par Psychiatry Resident\par \par CDW Dr. Melgar\par Return to clinic in 3 months

## 2020-11-19 NOTE — PHYSICAL EXAM
[No Acute Distress] : no acute distress [Well Nourished] : well nourished [Well Developed] : well developed [Well-Appearing] : well-appearing [Normal Sclera/Conjunctiva] : normal sclera/conjunctiva [PERRL] : pupils equal round and reactive to light [EOMI] : extraocular movements intact [Normal Outer Ear/Nose] : the outer ears and nose were normal in appearance [Normal Oropharynx] : the oropharynx was normal [No JVD] : no jugular venous distention [No Lymphadenopathy] : no lymphadenopathy [Supple] : supple [Thyroid Normal, No Nodules] : the thyroid was normal and there were no nodules present [No Respiratory Distress] : no respiratory distress  [No Accessory Muscle Use] : no accessory muscle use [Clear to Auscultation] : lungs were clear to auscultation bilaterally [Normal Rate] : normal rate  [Regular Rhythm] : with a regular rhythm [Normal S1, S2] : normal S1 and S2 [No Murmur] : no murmur heard [No Carotid Bruits] : no carotid bruits [No Abdominal Bruit] : a ~M bruit was not heard ~T in the abdomen [No Varicosities] : no varicosities [Pedal Pulses Present] : the pedal pulses are present [No Edema] : there was no peripheral edema [No Palpable Aorta] : no palpable aorta [No Extremity Clubbing/Cyanosis] : no extremity clubbing/cyanosis [Soft] : abdomen soft [Non Tender] : non-tender [Non-distended] : non-distended [No Masses] : no abdominal mass palpated [No HSM] : no HSM [Normal Bowel Sounds] : normal bowel sounds [Normal Posterior Cervical Nodes] : no posterior cervical lymphadenopathy [Normal Anterior Cervical Nodes] : no anterior cervical lymphadenopathy [No CVA Tenderness] : no CVA  tenderness [No Spinal Tenderness] : no spinal tenderness [No Joint Swelling] : no joint swelling [Grossly Normal Strength/Tone] : grossly normal strength/tone [No Rash] : no rash [Coordination Grossly Intact] : coordination grossly intact [No Focal Deficits] : no focal deficits [Normal Gait] : normal gait [Normal Affect] : the affect was normal [Normal Insight/Judgement] : insight and judgment were intact [de-identified] : midline incision in precordium from CABG. No pain on palpation.

## 2020-11-19 NOTE — END OF VISIT
[] : Resident [FreeTextEntry3] : 65-year-old man with a history of prediabetes, hypertension, hyperlipidemia, coronary artery disease status post CABG and multiple stents with peripheral neuropathy who is here for follow-up.  Struggles with insomnia due to work stress and complains of burning pain in his feet as well as chronic stable angina that has not changed in character and self resolves.  Also reports already received flu shot.  On examination he is well-appearing, ambulating well.  Unremarkable foot exam–no skin lesions, normal strength, normal sensation.  Plan: We will increase gabapentin dose as he was only taking 600 mg gabapentin before bed.  Counseled on sleep hygiene.\par He is traveling to [facility] in the coming weeks, recommended malaria prophylaxis, hepatitis A vaccination.  He reports that he is already vaccinated for hepatitis B.  Noted to him that we do not do SARS-CoV-2 swab testing at this site.\par

## 2020-11-24 LAB — H PYLORI AG STL QL: DETECTED — HIGH

## 2020-12-18 NOTE — HISTORY OF PRESENT ILLNESS
[FreeTextEntry1] : 65 year old man PMH HTN, HLD, pre-diabetes (HbA1c 6.2), CAD s/p MI 3V CABG 99, [facility] s/p PCI 8/2018 (patent SVG to OM2 and SVG to D1; PCI w OLYA to mRCA, pRCA; LIMA atretic w mild diffuse disease) presenting for follow-up. \par Ocassional feeling of BENOIT/Chest tightness. \par \par \par [facility] (8/2018) : \par CORONARY VESSELS: LAD: The distal vessel was supplied by retrograde flow from\par the graft(s) to the mid LAD. The LIMA to LAD is atretic.\par DIAGNOSTIC IMPRESSIONS: Successful PCI to severe stenosis of proximal and mid\par RCA using Synergy OLYA (3 x stents)\par Patent SVG to OM-2, Patent SVG to D-1\par LIMA to LAD is atretic, native LAD proximally has severe stenosis that is worse\par compared to last cath, and this is impeding retrograde flow from D-1 into LAD.\par DIAGNOSTIC RECOMMENDATIONS: DAPT x 12 months\par Aggressive risk factor modification\par Plan for PCI to LAD , if patient has refractory anginal symptoms despite medical therapy \par INTERVENTIONAL IMPRESSIONS: Successful PCI to severe stenosis of proximal and mid RCA using Synergy OLYA (3 x stents) Patent SVG to OM-2, Patent SVG to D-1 LIMA to LAD is atretic, native LAD proximally has severe stenosis that is worse compared to last cath, and this is impeding retrograde flow from D-1 into LAD.\par

## 2020-12-18 NOTE — PHYSICAL EXAM
[General Appearance - Well Developed] : well developed [Normal Appearance] : normal appearance [Well Groomed] : well groomed [General Appearance - Well Nourished] : well nourished [No Deformities] : no deformities [General Appearance - In No Acute Distress] : no acute distress [Normal Conjunctiva] : the conjunctiva exhibited no abnormalities [Eyelids - No Xanthelasma] : the eyelids demonstrated no xanthelasmas [Normal Oral Mucosa] : normal oral mucosa [No Oral Pallor] : no oral pallor [No Oral Cyanosis] : no oral cyanosis [Normal Jugular Venous A Waves Present] : normal jugular venous A waves present [Normal Jugular Venous V Waves Present] : normal jugular venous V waves present [No Jugular Venous Hawthorne A Waves] : no jugular venous hawthorne A waves [Heart Rate And Rhythm] : heart rate and rhythm were normal [Heart Sounds] : normal S1 and S2 [Murmurs] : no murmurs present [Respiration, Rhythm And Depth] : normal respiratory rhythm and effort [Exaggerated Use Of Accessory Muscles For Inspiration] : no accessory muscle use [Auscultation Breath Sounds / Voice Sounds] : lungs were clear to auscultation bilaterally [Abdomen Soft] : soft [Abdomen Tenderness] : non-tender [Abdomen Mass (___ Cm)] : no abdominal mass palpated [Abnormal Walk] : normal gait [Gait - Sufficient For Exercise Testing] : the gait was sufficient for exercise testing [Nail Clubbing] : no clubbing of the fingernails [Cyanosis, Localized] : no localized cyanosis [Petechial Hemorrhages (___cm)] : no petechial hemorrhages [Skin Color & Pigmentation] : normal skin color and pigmentation [] : no rash [No Venous Stasis] : no venous stasis [Skin Lesions] : no skin lesions [No Skin Ulcers] : no skin ulcer [No Xanthoma] : no  xanthoma was observed [Oriented To Time, Place, And Person] : oriented to person, place, and time [Affect] : the affect was normal [Mood] : the mood was normal [No Anxiety] : not feeling anxious

## 2020-12-18 NOTE — DISCUSSION/SUMMARY
[FreeTextEntry1] : 1. CAD:considering his symptoms, i recommend nuclear stress test to assess for ischemia. \par \par 2/ HTN: BP mild elevation, will fu next visit

## 2021-01-02 ENCOUNTER — NON-APPOINTMENT (OUTPATIENT)
Age: 66
End: 2021-01-02

## 2021-01-02 DIAGNOSIS — Z23 ENCOUNTER FOR IMMUNIZATION: ICD-10-CM

## 2021-01-04 ENCOUNTER — OUTPATIENT (OUTPATIENT)
Dept: OUTPATIENT SERVICES | Facility: HOSPITAL | Age: 66
LOS: 1 days | End: 2021-01-04

## 2021-01-04 ENCOUNTER — APPOINTMENT (OUTPATIENT)
Dept: INTERNAL MEDICINE | Facility: CLINIC | Age: 66
End: 2021-01-04

## 2021-01-04 VITALS — TEMPERATURE: 97.4 F

## 2021-01-04 DIAGNOSIS — Z95.1 PRESENCE OF AORTOCORONARY BYPASS GRAFT: Chronic | ICD-10-CM

## 2021-01-07 DIAGNOSIS — Z23 ENCOUNTER FOR IMMUNIZATION: ICD-10-CM

## 2021-01-29 RX ORDER — OMEPRAZOLE 20 MG/1
20 CAPSULE, DELAYED RELEASE ORAL TWICE DAILY
Qty: 28 | Refills: 0 | Status: DISCONTINUED | COMMUNITY
Start: 2020-11-25 | End: 2021-01-29

## 2021-02-02 RX ORDER — PANTOPRAZOLE 40 MG/1
40 TABLET, DELAYED RELEASE ORAL
Qty: 28 | Refills: 0 | Status: DISCONTINUED | COMMUNITY
Start: 2021-01-29 | End: 2021-02-02

## 2021-02-10 RX ORDER — OMEPRAZOLE, SODIUM BICARBONATE 20; 1680 MG/1; MG/1
20-1680 POWDER, FOR SUSPENSION ORAL
Qty: 3 | Refills: 1 | Status: DISCONTINUED | COMMUNITY
Start: 2021-02-02 | End: 2021-02-10

## 2021-02-11 ENCOUNTER — APPOINTMENT (OUTPATIENT)
Dept: OPHTHALMOLOGY | Facility: CLINIC | Age: 66
End: 2021-02-11

## 2021-02-18 ENCOUNTER — APPOINTMENT (OUTPATIENT)
Dept: INTERNAL MEDICINE | Facility: CLINIC | Age: 66
End: 2021-02-18
Payer: MEDICARE

## 2021-02-18 ENCOUNTER — OUTPATIENT (OUTPATIENT)
Dept: OUTPATIENT SERVICES | Facility: HOSPITAL | Age: 66
LOS: 1 days | End: 2021-02-18

## 2021-02-18 DIAGNOSIS — A04.8 OTHER SPECIFIED BACTERIAL INTESTINAL INFECTIONS: ICD-10-CM

## 2021-02-18 DIAGNOSIS — I10 ESSENTIAL (PRIMARY) HYPERTENSION: ICD-10-CM

## 2021-02-18 DIAGNOSIS — I20.9 ANGINA PECTORIS, UNSPECIFIED: ICD-10-CM

## 2021-02-18 DIAGNOSIS — I25.10 ATHEROSCLEROTIC HEART DISEASE OF NATIVE CORONARY ARTERY W/OUT ANGINA PECTORIS: ICD-10-CM

## 2021-02-18 DIAGNOSIS — Z95.1 PRESENCE OF AORTOCORONARY BYPASS GRAFT: Chronic | ICD-10-CM

## 2021-02-18 PROCEDURE — ZZZZZ: CPT

## 2021-02-18 RX ORDER — TETRACYCLINE HYDROCHLORIDE 500 MG/1
500 CAPSULE ORAL
Qty: 56 | Refills: 0 | Status: DISCONTINUED | COMMUNITY
Start: 2020-11-25 | End: 2021-02-18

## 2021-02-18 RX ORDER — BISMUTH SUBSALICYLATE 525 MG/30ML
525 LIQUID ORAL EVERY 6 HOURS
Qty: 56 | Refills: 0 | Status: DISCONTINUED | COMMUNITY
Start: 2020-11-25 | End: 2021-02-18

## 2021-02-18 RX ORDER — METRONIDAZOLE 250 MG/1
250 TABLET ORAL EVERY 6 HOURS
Qty: 56 | Refills: 0 | Status: DISCONTINUED | COMMUNITY
Start: 2020-11-25 | End: 2021-02-18

## 2021-02-18 RX ORDER — DEXLANSOPRAZOLE 30 MG/1
30 CAPSULE, DELAYED RELEASE ORAL DAILY
Qty: 28 | Refills: 0 | Status: DISCONTINUED | COMMUNITY
Start: 2021-02-10 | End: 2021-02-18

## 2021-02-21 PROBLEM — I20.9 ANGINA PECTORIS: Status: ACTIVE | Noted: 2018-08-16

## 2021-02-21 PROBLEM — A04.8 H. PYLORI INFECTION: Status: ACTIVE | Noted: 2020-01-29

## 2021-02-21 NOTE — ADDENDUM
[FreeTextEntry1] : reviewed with resident via telephone due to covid19 Agree with resiidents evaluation and plan [email or web address]

## 2021-02-21 NOTE — HISTORY OF PRESENT ILLNESS
[Home] : at home, [unfilled] , at the time of the visit. [Other Location: e.g. Home (Enter Location, City,State)___] : at [unfilled] [Verbal consent obtained from patient] : the patient, [unfilled] [FreeTextEntry1] : Follow-up [de-identified] : 66 M with pre-DM, HTN, HLD, CAD (s/p CABG 1999, multiple PCI, last 8/2018 with 3 OLYA), H pylori infection refractory of triple therapy, presents for follow-up.\par \par 1. Patient had an accidental amputation of his thumb on 1/23/21. Went to hospital, had sutures, but thumb could not be reattached. Completed a dose of cephalexin after the episode.f\par \par 2. 11 family members had COVID 1 month ago. Patient also tested around that times and was negative. Has since been isolating in [place], where he has a house. Wife also had COVID and is living away from patient. Patient never had any symptoms. 3 family members continue to test positive. Patient planning to return to [place] in 1 week. \par \par 3. Patient was treated for H pylori in 1/2020 with triple therapy. Stool was tested in 11/2020, and still psoitive for H pylori. Started on quadruple therapy, which patient completed this week. Plan is to be off PPI from now and will send stool sample in early March for retesting. Patient reports feeling lousy, decreased appetite and decreased taste since starting quadruple therapy.\par \par 4. Continues to have intermittent CP (lasts for 1 second, sternal), not related to exertion. No SOB. Was supposed to have Nuclear stress test in 11/2020, but had scheduling issues. No CP currently.

## 2021-02-21 NOTE — ASSESSMENT
[FreeTextEntry1] : 66 M with pre-DM, HTN, HLD, CAD (s/p CABG 1999, multiple PCI, last 8/2018 with 3 OLYA), H pylori infection refractory of triple therapy, presents for follow-up.\par \par HCM: \par - Received flu vaccine in 2020 at pharmacy.\par - Recommended patient to get COVID vaccine. \par - Recheck CBC, CMP, A1c, lipids at next visit.\par \par RTC in 1 week. Case d/w Dr. White.

## 2021-02-22 DIAGNOSIS — I25.10 ATHEROSCLEROTIC HEART DISEASE OF NATIVE CORONARY ARTERY WITHOUT ANGINA PECTORIS: ICD-10-CM

## 2021-02-22 DIAGNOSIS — A04.8 OTHER SPECIFIED BACTERIAL INTESTINAL INFECTIONS: ICD-10-CM

## 2021-02-22 DIAGNOSIS — I20.9 ANGINA PECTORIS, UNSPECIFIED: ICD-10-CM

## 2021-02-22 DIAGNOSIS — I10 ESSENTIAL (PRIMARY) HYPERTENSION: ICD-10-CM

## 2021-03-22 ENCOUNTER — APPOINTMENT (OUTPATIENT)
Dept: CV DIAGNOSTICS | Facility: HOSPITAL | Age: 66
End: 2021-03-22
Payer: MEDICARE

## 2021-03-22 ENCOUNTER — OUTPATIENT (OUTPATIENT)
Dept: OUTPATIENT SERVICES | Facility: HOSPITAL | Age: 66
LOS: 1 days | End: 2021-03-22

## 2021-03-22 DIAGNOSIS — Z95.1 PRESENCE OF AORTOCORONARY BYPASS GRAFT: Chronic | ICD-10-CM

## 2021-03-22 DIAGNOSIS — I20.9 ANGINA PECTORIS, UNSPECIFIED: ICD-10-CM

## 2021-03-22 PROCEDURE — 78452 HT MUSCLE IMAGE SPECT MULT: CPT | Mod: 26

## 2021-03-22 PROCEDURE — 93016 CV STRESS TEST SUPVJ ONLY: CPT | Mod: GC

## 2021-03-22 PROCEDURE — 93018 CV STRESS TEST I&R ONLY: CPT | Mod: GC

## 2021-03-31 ENCOUNTER — APPOINTMENT (OUTPATIENT)
Dept: CARDIOLOGY | Facility: CLINIC | Age: 66
End: 2021-03-31
Payer: MEDICARE

## 2021-03-31 VITALS
DIASTOLIC BLOOD PRESSURE: 86 MMHG | TEMPERATURE: 94.4 F | HEART RATE: 64 BPM | SYSTOLIC BLOOD PRESSURE: 174 MMHG | RESPIRATION RATE: 14 BRPM | HEIGHT: 67 IN | BODY MASS INDEX: 29.66 KG/M2 | WEIGHT: 189 LBS | OXYGEN SATURATION: 97 %

## 2021-03-31 PROCEDURE — 99072 ADDL SUPL MATRL&STAF TM PHE: CPT

## 2021-03-31 PROCEDURE — 93000 ELECTROCARDIOGRAM COMPLETE: CPT

## 2021-03-31 PROCEDURE — 99215 OFFICE O/P EST HI 40 MIN: CPT

## 2021-03-31 NOTE — HISTORY OF PRESENT ILLNESS
[FreeTextEntry1] : 65 year old man PMH HTN, HLD, pre-diabetes (HbA1c 6.2), CAD s/p MI 3V CABG 99, [facility] s/p PCI 8/2018 (patent SVG to OM2 and SVG to D1; PCI w OLYA to mRCA, pRCA; LIMA atretic w mild diffuse disease) presenting for follow-up. \par Ocassional feeling of BENOIT/Chest tightness. \par Stress test shows inferior ischemia. \par \par \par [facility] (8/2018) : \par CORONARY VESSELS: LAD: The distal vessel was supplied by retrograde flow from\par the graft(s) to the mid LAD. The LIMA to LAD is atretic.\par DIAGNOSTIC IMPRESSIONS: Successful PCI to severe stenosis of proximal and mid\par RCA using Synergy OLYA (3 x stents)\par Patent SVG to OM-2, Patent SVG to D-1\par LIMA to LAD is atretic, native LAD proximally has severe stenosis that is worse\par compared to last cath, and this is impeding retrograde flow from D-1 into LAD.\par DIAGNOSTIC RECOMMENDATIONS: DAPT x 12 months\par Aggressive risk factor modification\par Plan for PCI to LAD , if patient has refractory anginal symptoms despite medical therapy \par INTERVENTIONAL IMPRESSIONS: Successful PCI to severe stenosis of proximal and mid RCA using Synergy OLYA (3 x stents) Patent SVG to OM-2, Patent SVG to D-1 LIMA to LAD is atretic, native LAD proximally has severe stenosis that is worse compared to last cath, and this is impeding retrograde flow from D-1 into LAD.\par

## 2021-03-31 NOTE — DISCUSSION/SUMMARY
[FreeTextEntry1] : 1. CAD:considering his symptoms,  and nuclear stress test findings recommend cath. \par VIANCAANISA was explained the details of the procedure, indications, risk and benefits . Patient agrees to the procedure with clear understanding of the information provided.\par will plan for 2nd week april as per patient. \par increase ISMN 60 daily. \par \par 2/ HTN: elevated . as above. \par \par will further titrate meds after cath

## 2021-04-12 ENCOUNTER — NON-APPOINTMENT (OUTPATIENT)
Age: 66
End: 2021-04-12

## 2021-04-12 ENCOUNTER — APPOINTMENT (OUTPATIENT)
Dept: OPHTHALMOLOGY | Facility: CLINIC | Age: 66
End: 2021-04-12
Payer: MEDICARE

## 2021-04-12 PROCEDURE — 92136 OPHTHALMIC BIOMETRY: CPT

## 2021-04-12 PROCEDURE — 99072 ADDL SUPL MATRL&STAF TM PHE: CPT

## 2021-04-12 PROCEDURE — 92014 COMPRE OPH EXAM EST PT 1/>: CPT

## 2021-04-13 ENCOUNTER — OUTPATIENT (OUTPATIENT)
Dept: OUTPATIENT SERVICES | Facility: HOSPITAL | Age: 66
LOS: 1 days | End: 2021-04-13
Payer: MEDICARE

## 2021-04-13 DIAGNOSIS — Z11.52 ENCOUNTER FOR SCREENING FOR COVID-19: ICD-10-CM

## 2021-04-13 PROCEDURE — U0003: CPT

## 2021-04-13 PROCEDURE — U0005: CPT

## 2021-04-13 PROCEDURE — C9803: CPT

## 2021-04-14 LAB — SARS-COV-2 RNA SPEC QL NAA+PROBE: SIGNIFICANT CHANGE UP

## 2021-04-16 ENCOUNTER — OUTPATIENT (OUTPATIENT)
Dept: OUTPATIENT SERVICES | Facility: HOSPITAL | Age: 66
LOS: 1 days | Discharge: HOME | End: 2021-04-16
Payer: MEDICARE

## 2021-04-16 DIAGNOSIS — I25.10 ATHEROSCLEROTIC HEART DISEASE OF NATIVE CORONARY ARTERY WITHOUT ANGINA PECTORIS: ICD-10-CM

## 2021-04-16 DIAGNOSIS — Z95.1 PRESENCE OF AORTOCORONARY BYPASS GRAFT: Chronic | ICD-10-CM

## 2021-04-16 LAB
A1C WITH ESTIMATED AVERAGE GLUCOSE RESULT: 6.3 % — HIGH (ref 4–5.6)
ANION GAP SERPL CALC-SCNC: 11 MMOL/L — SIGNIFICANT CHANGE UP (ref 7–14)
BUN SERPL-MCNC: 16 MG/DL — SIGNIFICANT CHANGE UP (ref 7–23)
CALCIUM SERPL-MCNC: 9.5 MG/DL — SIGNIFICANT CHANGE UP (ref 8.4–10.5)
CHLORIDE SERPL-SCNC: 102 MMOL/L — SIGNIFICANT CHANGE UP (ref 98–107)
CO2 SERPL-SCNC: 25 MMOL/L — SIGNIFICANT CHANGE UP (ref 22–31)
CREAT SERPL-MCNC: 1.25 MG/DL — SIGNIFICANT CHANGE UP (ref 0.5–1.3)
ESTIMATED AVERAGE GLUCOSE: 134 MG/DL — HIGH (ref 68–114)
GLUCOSE SERPL-MCNC: 107 MG/DL — HIGH (ref 70–99)
HCT VFR BLD CALC: 42.1 % — SIGNIFICANT CHANGE UP (ref 39–50)
HGB BLD-MCNC: 13.2 G/DL — SIGNIFICANT CHANGE UP (ref 13–17)
MCHC RBC-ENTMCNC: 28.7 PG — SIGNIFICANT CHANGE UP (ref 27–34)
MCHC RBC-ENTMCNC: 31.4 GM/DL — LOW (ref 32–36)
MCV RBC AUTO: 91.5 FL — SIGNIFICANT CHANGE UP (ref 80–100)
NRBC # BLD: 0 /100 WBCS — SIGNIFICANT CHANGE UP
NRBC # FLD: 0 K/UL — SIGNIFICANT CHANGE UP
PLATELET # BLD AUTO: 191 K/UL — SIGNIFICANT CHANGE UP (ref 150–400)
POTASSIUM SERPL-MCNC: 4.4 MMOL/L — SIGNIFICANT CHANGE UP (ref 3.5–5.3)
POTASSIUM SERPL-SCNC: 4.4 MMOL/L — SIGNIFICANT CHANGE UP (ref 3.5–5.3)
RBC # BLD: 4.6 M/UL — SIGNIFICANT CHANGE UP (ref 4.2–5.8)
RBC # FLD: 12.5 % — SIGNIFICANT CHANGE UP (ref 10.3–14.5)
SODIUM SERPL-SCNC: 138 MMOL/L — SIGNIFICANT CHANGE UP (ref 135–145)
WBC # BLD: 6.55 K/UL — SIGNIFICANT CHANGE UP (ref 3.8–10.5)
WBC # FLD AUTO: 6.55 K/UL — SIGNIFICANT CHANGE UP (ref 3.8–10.5)

## 2021-04-16 PROCEDURE — 93455 CORONARY ART/GRFT ANGIO S&I: CPT | Mod: 26

## 2021-04-16 PROCEDURE — 93010 ELECTROCARDIOGRAM REPORT: CPT

## 2021-04-16 PROCEDURE — 99152 MOD SED SAME PHYS/QHP 5/>YRS: CPT

## 2021-04-16 RX ORDER — ISOSORBIDE MONONITRATE 60 MG/1
1 TABLET, EXTENDED RELEASE ORAL
Qty: 0 | Refills: 0 | DISCHARGE

## 2021-04-16 RX ORDER — EZETIMIBE 10 MG/1
1 TABLET ORAL
Qty: 0 | Refills: 0 | DISCHARGE

## 2021-04-16 RX ORDER — ASPIRIN/CALCIUM CARB/MAGNESIUM 324 MG
1 TABLET ORAL
Qty: 0 | Refills: 0 | DISCHARGE

## 2021-04-16 RX ORDER — ATORVASTATIN CALCIUM 80 MG/1
1 TABLET, FILM COATED ORAL
Qty: 0 | Refills: 0 | DISCHARGE

## 2021-04-16 RX ORDER — RANITIDINE HYDROCHLORIDE 150 MG/1
1 TABLET, FILM COATED ORAL
Qty: 0 | Refills: 0 | DISCHARGE

## 2021-04-16 RX ORDER — GABAPENTIN 400 MG/1
3 CAPSULE ORAL
Qty: 0 | Refills: 0 | DISCHARGE

## 2021-04-16 RX ORDER — SODIUM CHLORIDE 9 MG/ML
3 INJECTION INTRAMUSCULAR; INTRAVENOUS; SUBCUTANEOUS EVERY 8 HOURS
Refills: 0 | Status: DISCONTINUED | OUTPATIENT
Start: 2021-04-16 | End: 2021-05-01

## 2021-04-16 RX ORDER — LISINOPRIL 2.5 MG/1
1 TABLET ORAL
Qty: 0 | Refills: 0 | DISCHARGE

## 2021-04-16 NOTE — CHART NOTE - NSCHARTNOTEFT_GEN_A_CORE
The patient was noted to have elevated HgbA1c 6.3. The patient was made aware of prediabetes. The patient was recommended to f/u with PMD for further treatment

## 2021-04-16 NOTE — H&P CARDIOLOGY - PMH
CAD (coronary artery disease)    Chest pain    Essential hypertension    Gastritis, presence of bleeding unspecified, unspecified chronicity, unspecified gastritis type    GERD (gastroesophageal reflux disease)    Hypercholesterolemia    Liver abscess  1981  Old MI (myocardial infarction)  1999  S/P CABG x 3  1999, [facility]

## 2021-04-16 NOTE — H&P CARDIOLOGY - HISTORY OF PRESENT ILLNESS
66 year old male with HTN, hyperlipidemia, known CAD with CABG and subsequent PTCA/stent with complaints of BENOIT and chest tightness. Underwent a Stress test revealing inferior wall ischemia.   In light of patients CAD history, symptoms and abnormal noninvasive test findings there is high suspicion for CAD progression. Patient is now referred to [facility] for a cardiac catheterization with possible PTCA/stent.     Denies fever, cough, chills, headache, flu like symptoms, sick contact or recent travel  COVID PCR not detected on 4/14/21    please see hard copy H&P in paper chart 3/31/21  PATIENT SEEN AND EXAMINED AND NO NEW CLINICAL CHANGES SINCE office visit

## 2021-05-19 ENCOUNTER — APPOINTMENT (OUTPATIENT)
Dept: CARDIOLOGY | Facility: CLINIC | Age: 66
End: 2021-05-19
Payer: MEDICARE

## 2021-05-19 ENCOUNTER — NON-APPOINTMENT (OUTPATIENT)
Age: 66
End: 2021-05-19

## 2021-05-19 VITALS
BODY MASS INDEX: 29.82 KG/M2 | SYSTOLIC BLOOD PRESSURE: 122 MMHG | DIASTOLIC BLOOD PRESSURE: 68 MMHG | OXYGEN SATURATION: 97 % | WEIGHT: 190 LBS | TEMPERATURE: 97.7 F | HEIGHT: 67 IN | HEART RATE: 61 BPM

## 2021-05-19 PROCEDURE — 93000 ELECTROCARDIOGRAM COMPLETE: CPT

## 2021-05-19 PROCEDURE — 99214 OFFICE O/P EST MOD 30 MIN: CPT

## 2021-05-21 ENCOUNTER — NON-APPOINTMENT (OUTPATIENT)
Age: 66
End: 2021-05-21

## 2021-06-03 ENCOUNTER — APPOINTMENT (OUTPATIENT)
Dept: OPHTHALMOLOGY | Facility: AMBULATORY SURGERY CENTER | Age: 66
End: 2021-06-03

## 2021-06-04 ENCOUNTER — APPOINTMENT (OUTPATIENT)
Dept: OPHTHALMOLOGY | Facility: CLINIC | Age: 66
End: 2021-06-04

## 2021-06-10 ENCOUNTER — APPOINTMENT (OUTPATIENT)
Dept: OPHTHALMOLOGY | Facility: CLINIC | Age: 66
End: 2021-06-10

## 2021-06-30 ENCOUNTER — APPOINTMENT (OUTPATIENT)
Dept: CARDIOLOGY | Facility: CLINIC | Age: 66
End: 2021-06-30

## 2021-07-02 ENCOUNTER — APPOINTMENT (OUTPATIENT)
Dept: OPHTHALMOLOGY | Facility: CLINIC | Age: 66
End: 2021-07-02

## 2021-07-23 NOTE — DISCUSSION/SUMMARY
[FreeTextEntry1] : 1. CAD: Chronic stable angina CCS class I, related to small vessel disease, will continue with medical therapy.\par \par 2/ HTN: Stable continue medical therapy\par

## 2021-07-23 NOTE — CARDIOLOGY SUMMARY
[de-identified] : \par LM:   --  LM: There was a tubular 90 % stenosis at a site with no prior\par intervention. There was MARICARMEN grade 3 flow through the vessel (brisk flow).\par  \par LAD:   --  Ostial LAD: There was a 100 % stenosis. This lesion is a chronic\par total occlusion.\par  \par CX:   --  Circumflex: The vessel was small sized. Angiography showed severe\par atherosclerosis.\par  \par RI:   --  Ramus intermedius: The vessel was small sized. Angiography showed\par severe atherosclerosis.\par  \par RCA:   --  Proximal RCA: There was a tubular 30 % stenosis at the site of a\par prior stent. There was MARICARMEN grade 3 flow through the vessel (brisk flow).\par --  Mid RCA: There was a diffuse 30 % stenosis at the site of a prior\par stent. There was MARICARMEN grade 3 flow through the vessel (brisk flow).\par --  RPDA: Angiography showed mild atherosclerosis with no flow limiting\par lesions.\par --  RPLS: Angiography showed mild atherosclerosis with no flow limiting\par lesions.\par  \par GRAFTS:   --  Graft to the LAD: The graft was a LIMA. Graft angiography\par showed moderate degeneration. Distal vessel angiography showed mild\par diffuse disease.\par --  Graft to the 1st diagonal: The graft was a saphenous vein graft from\par the ascending aorta. Distal vessel angiography showed a medium sized\par vessel and mild diffuse disease.\par --  Graft to the 1st obtuse marginal: The graft was a medium sized\par saphenous vein graft from the ascending aorta. Graft angiography showed\par mild degeneration. Distal vessel angiography showed mild diffuse disease.\par  \par COMPLICATIONS: There were no complications.\par DIAGNOSTIC IMPRESSIONS: Patent LIMA to LAD, SVG to D-1 , SVG to OM-1\par Patent stents RCA\par  \par DIAGNOSTIC RECOMMENDATIONS: Medical therapy.\par INTERVENTIONAL IMPRESSIONS: Patent LIMA to LAD, SVG to D-1 , SVG to OM-1\par Patent stents RCA\par  \par INTERVENTIONAL RECOMMENDATIONS: Medical therapy.\par  \par  [___] : [unfilled] [LVEF ___%] : LVEF [unfilled]%

## 2021-07-23 NOTE — HISTORY OF PRESENT ILLNESS
[FreeTextEntry1] : 65 year old man PMH HTN, HLD, pre-diabetes (HbA1c 6.2), CAD s/p MI 3V CABG 99, [facility] s/p PCI 8/2018 (patent SVG to OM2 and SVG to D1; PCI w OLYA to mRCA, pRCA; LIMA atretic w mild diffuse disease) presenting for follow-up. \par His angina is much better\par

## 2021-07-23 NOTE — PHYSICAL EXAM
[General Appearance - Well Developed] : well developed [Normal Appearance] : normal appearance [Well Groomed] : well groomed [General Appearance - Well Nourished] : well nourished [No Deformities] : no deformities [General Appearance - In No Acute Distress] : no acute distress [Normal Conjunctiva] : the conjunctiva exhibited no abnormalities [Eyelids - No Xanthelasma] : the eyelids demonstrated no xanthelasmas [Normal Oral Mucosa] : normal oral mucosa [No Oral Pallor] : no oral pallor [No Oral Cyanosis] : no oral cyanosis [Normal Jugular Venous A Waves Present] : normal jugular venous A waves present [Normal Jugular Venous V Waves Present] : normal jugular venous V waves present [No Jugular Venous Hawthorne A Waves] : no jugular venous hawthonre A waves [Respiration, Rhythm And Depth] : normal respiratory rhythm and effort [Exaggerated Use Of Accessory Muscles For Inspiration] : no accessory muscle use [Auscultation Breath Sounds / Voice Sounds] : lungs were clear to auscultation bilaterally [Heart Rate And Rhythm] : heart rate and rhythm were normal [Heart Sounds] : normal S1 and S2 [Murmurs] : no murmurs present [Abdomen Soft] : soft [Abdomen Tenderness] : non-tender [Abdomen Mass (___ Cm)] : no abdominal mass palpated [Abnormal Walk] : normal gait [Gait - Sufficient For Exercise Testing] : the gait was sufficient for exercise testing [Nail Clubbing] : no clubbing of the fingernails [Cyanosis, Localized] : no localized cyanosis [Petechial Hemorrhages (___cm)] : no petechial hemorrhages [Skin Color & Pigmentation] : normal skin color and pigmentation [] : no rash [No Venous Stasis] : no venous stasis [No Skin Ulcers] : no skin ulcer [Skin Lesions] : no skin lesions [No Xanthoma] : no  xanthoma was observed [Oriented To Time, Place, And Person] : oriented to person, place, and time [Affect] : the affect was normal [Mood] : the mood was normal [No Anxiety] : not feeling anxious

## 2021-10-11 ENCOUNTER — RX RENEWAL (OUTPATIENT)
Age: 66
End: 2021-10-11

## 2021-11-09 RX ORDER — RANOLAZINE 500 MG/1
500 TABLET, EXTENDED RELEASE ORAL
Qty: 180 | Refills: 3 | Status: ACTIVE | COMMUNITY
Start: 2021-05-19 | End: 1900-01-01

## 2021-11-09 RX ORDER — EZETIMIBE 10 MG/1
10 TABLET ORAL
Qty: 90 | Refills: 3 | Status: ACTIVE | COMMUNITY
Start: 2019-07-18 | End: 1900-01-01

## 2021-11-09 RX ORDER — ISOSORBIDE MONONITRATE 60 MG/1
60 TABLET, EXTENDED RELEASE ORAL DAILY
Qty: 90 | Refills: 3 | Status: ACTIVE | COMMUNITY
Start: 2018-09-20 | End: 1900-01-01

## 2022-01-04 ENCOUNTER — APPOINTMENT (OUTPATIENT)
Dept: INTERNAL MEDICINE | Facility: CLINIC | Age: 67
End: 2022-01-04

## 2022-01-10 ENCOUNTER — RX RENEWAL (OUTPATIENT)
Age: 67
End: 2022-01-10

## 2022-05-31 NOTE — H&P PST ADULT - LAST PROSTATE EXAM
For information on Fall & Injury Prevention, visit: [email or web address] OR  [email or web address] OR  https://www.cdc.gov/steadi/patient.html
2016

## 2023-05-08 NOTE — ED ADULT NURSE NOTE - NS ED NURSE DISCH DISPOSITION
Your echocardiogram is within normal limits.   Your heart squeezes normally.  There are no significant valvular abnormalities noted. Please let me know if you have any questions or concerns      
Discharged

## 2024-03-21 ENCOUNTER — EMERGENCY (EMERGENCY)
Facility: HOSPITAL | Age: 69
LOS: 1 days | Discharge: HOME | End: 2024-03-21
Attending: EMERGENCY MEDICINE | Admitting: EMERGENCY MEDICINE
Payer: MEDICARE

## 2024-03-21 VITALS
OXYGEN SATURATION: 98 % | RESPIRATION RATE: 17 BRPM | DIASTOLIC BLOOD PRESSURE: 74 MMHG | HEART RATE: 62 BPM | TEMPERATURE: 97 F | SYSTOLIC BLOOD PRESSURE: 136 MMHG

## 2024-03-21 VITALS
HEART RATE: 69 BPM | TEMPERATURE: 98 F | DIASTOLIC BLOOD PRESSURE: 79 MMHG | RESPIRATION RATE: 18 BRPM | SYSTOLIC BLOOD PRESSURE: 184 MMHG | OXYGEN SATURATION: 99 %

## 2024-03-21 DIAGNOSIS — Z95.1 PRESENCE OF AORTOCORONARY BYPASS GRAFT: Chronic | ICD-10-CM

## 2024-03-21 LAB
ALBUMIN SERPL ELPH-MCNC: 3.9 G/DL — SIGNIFICANT CHANGE UP (ref 3.3–5)
ALP SERPL-CCNC: 87 U/L — SIGNIFICANT CHANGE UP (ref 40–120)
ALT FLD-CCNC: 57 U/L — HIGH (ref 4–41)
ANION GAP SERPL CALC-SCNC: 12 MMOL/L — SIGNIFICANT CHANGE UP (ref 7–14)
AST SERPL-CCNC: 58 U/L — HIGH (ref 4–40)
BASE EXCESS BLDV CALC-SCNC: -0.1 MMOL/L — SIGNIFICANT CHANGE UP (ref -2–3)
BASE EXCESS BLDV CALC-SCNC: -0.5 MMOL/L — SIGNIFICANT CHANGE UP (ref -2–3)
BILIRUB SERPL-MCNC: 0.4 MG/DL — SIGNIFICANT CHANGE UP (ref 0.2–1.2)
BLOOD GAS VENOUS COMPREHENSIVE RESULT: SIGNIFICANT CHANGE UP
BUN SERPL-MCNC: 25 MG/DL — HIGH (ref 7–23)
CA-I SERPL-SCNC: 1.19 MMOL/L — SIGNIFICANT CHANGE UP (ref 1.15–1.33)
CALCIUM SERPL-MCNC: 8.8 MG/DL — SIGNIFICANT CHANGE UP (ref 8.4–10.5)
CHLORIDE BLDV-SCNC: 104 MMOL/L — SIGNIFICANT CHANGE UP (ref 96–108)
CHLORIDE BLDV-SCNC: 105 MMOL/L — SIGNIFICANT CHANGE UP (ref 96–108)
CHLORIDE SERPL-SCNC: 107 MMOL/L — SIGNIFICANT CHANGE UP (ref 98–107)
CO2 BLDV-SCNC: 27.9 MMOL/L — HIGH (ref 22–26)
CO2 BLDV-SCNC: 28.4 MMOL/L — HIGH (ref 22–26)
CO2 SERPL-SCNC: 23 MMOL/L — SIGNIFICANT CHANGE UP (ref 22–31)
CREAT SERPL-MCNC: 1.1 MG/DL — SIGNIFICANT CHANGE UP (ref 0.5–1.3)
EGFR: 72 ML/MIN/1.73M2 — SIGNIFICANT CHANGE UP
GAS PNL BLDV: 137 MMOL/L — SIGNIFICANT CHANGE UP (ref 136–145)
GAS PNL BLDV: 137 MMOL/L — SIGNIFICANT CHANGE UP (ref 136–145)
GAS PNL BLDV: SIGNIFICANT CHANGE UP
GLUCOSE BLDV-MCNC: 88 MG/DL — SIGNIFICANT CHANGE UP (ref 70–99)
GLUCOSE BLDV-MCNC: 93 MG/DL — SIGNIFICANT CHANGE UP (ref 70–99)
GLUCOSE SERPL-MCNC: 93 MG/DL — SIGNIFICANT CHANGE UP (ref 70–99)
HCO3 BLDV-SCNC: 26 MMOL/L — SIGNIFICANT CHANGE UP (ref 22–29)
HCO3 BLDV-SCNC: 27 MMOL/L — SIGNIFICANT CHANGE UP (ref 22–29)
HCT VFR BLD CALC: 37.6 % — LOW (ref 39–50)
HCT VFR BLDA CALC: 38 % — LOW (ref 39–51)
HCT VFR BLDA CALC: 39 % — SIGNIFICANT CHANGE UP (ref 39–51)
HGB BLD CALC-MCNC: 12.8 G/DL — SIGNIFICANT CHANGE UP (ref 12.6–17.4)
HGB BLD CALC-MCNC: 13 G/DL — SIGNIFICANT CHANGE UP (ref 12.6–17.4)
HGB BLD-MCNC: 12.2 G/DL — LOW (ref 13–17)
IANC: 7.58 K/UL — HIGH (ref 1.8–7.4)
LACTATE BLDV-MCNC: 1.8 MMOL/L — SIGNIFICANT CHANGE UP (ref 0.5–2)
LACTATE BLDV-MCNC: 2 MMOL/L — SIGNIFICANT CHANGE UP (ref 0.5–2)
MCHC RBC-ENTMCNC: 29.2 PG — SIGNIFICANT CHANGE UP (ref 27–34)
MCHC RBC-ENTMCNC: 32.4 GM/DL — SIGNIFICANT CHANGE UP (ref 32–36)
MCV RBC AUTO: 90 FL — SIGNIFICANT CHANGE UP (ref 80–100)
PCO2 BLDV: 51 MMHG — SIGNIFICANT CHANGE UP (ref 42–55)
PCO2 BLDV: 52 MMHG — SIGNIFICANT CHANGE UP (ref 42–55)
PH BLDV: 7.32 — SIGNIFICANT CHANGE UP (ref 7.32–7.43)
PH BLDV: 7.32 — SIGNIFICANT CHANGE UP (ref 7.32–7.43)
PLATELET # BLD AUTO: 192 K/UL — SIGNIFICANT CHANGE UP (ref 150–400)
PO2 BLDV: 30 MMHG — SIGNIFICANT CHANGE UP (ref 25–45)
PO2 BLDV: 32 MMHG — SIGNIFICANT CHANGE UP (ref 25–45)
POTASSIUM BLDV-SCNC: 3.5 MMOL/L — SIGNIFICANT CHANGE UP (ref 3.5–5.1)
POTASSIUM BLDV-SCNC: 3.6 MMOL/L — SIGNIFICANT CHANGE UP (ref 3.5–5.1)
POTASSIUM SERPL-MCNC: 3.8 MMOL/L — SIGNIFICANT CHANGE UP (ref 3.5–5.3)
POTASSIUM SERPL-SCNC: 3.8 MMOL/L — SIGNIFICANT CHANGE UP (ref 3.5–5.3)
PROT SERPL-MCNC: 7.1 G/DL — SIGNIFICANT CHANGE UP (ref 6–8.3)
RBC # BLD: 4.18 M/UL — LOW (ref 4.2–5.8)
RBC # FLD: 12.7 % — SIGNIFICANT CHANGE UP (ref 10.3–14.5)
SAO2 % BLDV: 44.2 % — LOW (ref 67–88)
SAO2 % BLDV: 51.5 % — LOW (ref 67–88)
SODIUM SERPL-SCNC: 142 MMOL/L — SIGNIFICANT CHANGE UP (ref 135–145)
WBC # BLD: 11.6 K/UL — HIGH (ref 3.8–10.5)
WBC # FLD AUTO: 11.6 K/UL — HIGH (ref 3.8–10.5)

## 2024-03-21 PROCEDURE — 93010 ELECTROCARDIOGRAM REPORT: CPT

## 2024-03-21 PROCEDURE — 99284 EMERGENCY DEPT VISIT MOD MDM: CPT

## 2024-03-21 RX ORDER — IPRATROPIUM/ALBUTEROL SULFATE 18-103MCG
3 AEROSOL WITH ADAPTER (GRAM) INHALATION ONCE
Refills: 0 | Status: COMPLETED | OUTPATIENT
Start: 2024-03-21 | End: 2024-03-21

## 2024-03-21 RX ORDER — KETOROLAC TROMETHAMINE 30 MG/ML
15 SYRINGE (ML) INJECTION ONCE
Refills: 0 | Status: DISCONTINUED | OUTPATIENT
Start: 2024-03-21 | End: 2024-03-21

## 2024-03-21 RX ORDER — ACETAMINOPHEN 500 MG
1000 TABLET ORAL ONCE
Refills: 0 | Status: COMPLETED | OUTPATIENT
Start: 2024-03-21 | End: 2024-03-21

## 2024-03-21 RX ORDER — SODIUM CHLORIDE 9 MG/ML
1000 INJECTION INTRAMUSCULAR; INTRAVENOUS; SUBCUTANEOUS ONCE
Refills: 0 | Status: COMPLETED | OUTPATIENT
Start: 2024-03-21 | End: 2024-03-21

## 2024-03-21 RX ADMIN — Medication 15 MILLIGRAM(S): at 22:30

## 2024-03-21 RX ADMIN — Medication 3 MILLILITER(S): at 22:59

## 2024-03-21 RX ADMIN — Medication 400 MILLIGRAM(S): at 22:30

## 2024-03-21 RX ADMIN — SODIUM CHLORIDE 1000 MILLILITER(S): 9 INJECTION INTRAMUSCULAR; INTRAVENOUS; SUBCUTANEOUS at 22:30

## 2024-03-21 NOTE — ED ADULT NURSE NOTE - NSICDXPASTMEDICALHX_GEN_ALL_CORE_FT
PAST MEDICAL HISTORY:  CAD (coronary artery disease)     Chest pain     Essential hypertension     Gastritis, presence of bleeding unspecified, unspecified chronicity, unspecified gastritis type     GERD (gastroesophageal reflux disease)     Hypercholesterolemia     Liver abscess 1981    Old MI (myocardial infarction) 1999    S/P CABG x 3 1999, [facility]

## 2024-03-21 NOTE — ED PROVIDER NOTE - PATIENT PORTAL LINK FT
You can access the FollowMyHealth Patient Portal offered by [facility] by registering at the following website: [email or web address]. By joining [employer]’s FollowMyHealth portal, you will also be able to view your health information using other applications (apps) compatible with our system.

## 2024-03-21 NOTE — ED PROVIDER NOTE - NSFOLLOWUPINSTRUCTIONS_ED_ALL_ED_FT
You were seen in ED for cough, and found to have a pneumonia. Take antibiotics as prescribed.    Follow up with your Primary Doctor within one week.    Return with any worsening chest pain, shortness of breath, blood in your sputum, or any other concerning symptoms.

## 2024-03-21 NOTE — ED ADULT NURSE NOTE - OBJECTIVE STATEMENT
69 year old male brought to room 18. Patient alert and oriented times four. Patient ambulatory at baseline. Patient c/o fever,  sore throat, SOB and rash around mouth and nose onset 1 week ago, reports was seen at urgent care with negative work up. hoarse voice noted, however, pt's breathing is even and unlabored. , dry  high pitched non productive cough noted, lung sounds clear B/L. Phx of CAD, CABGx3, HTN  patient endorses recent travel to [facility]. Report given to LORETTA whitney. Patient moved to room 7.   patient laying in semi fowlers position on the stretcher. patient denies shortness of breath, chest pain, nausea, vomiting, chill, fever. Patient normal sinus on the monitor. Respirations equal and adequate. Patients 20 gauge left a.c IV patent, no signs of infiltration. Safety measures in place, call bell within reach. Patient stable upon leaving the room.

## 2024-03-21 NOTE — ED ADULT TRIAGE NOTE - CHIEF COMPLAINT QUOTE
c/o fever,  sore throat, SOB and rash around mouth and nose onset 1 week ago, reports was seen at urgent care with negative work up. hoarse voice noted, however, pt's breathing is even and unlabored. , dry  high pitched non productive cough noted, lung sounds clear B/L. Phx of CAD, CABGx3, HTN

## 2024-03-21 NOTE — ED PROVIDER NOTE - CLINICAL SUMMARY MEDICAL DECISION MAKING FREE TEXT BOX
Valerie Campbell MD attending physician patient with recent travel from [facility] comes in with headache and cough and fever.  Patient has pronounced laryngitis.  He has a very high-pitched cough.  Here his primary complaint is the headache.    Vital signs 184/79 respiratory rate 18 heart rate 69 temperature 97.9 O2 sat 100  Lungs are clear without wheeze he has no stridor patient is able to ambulate and communicate although he has a very hoarse voice.    Patient to get [facility] chest film labs and swab.  Likely viral although given the travel will be isolated until x-rays done

## 2024-03-21 NOTE — ED PROVIDER NOTE - ATTENDING CONTRIBUTION TO CARE
Valerie Campbell MD attending physician patient with recent travel from [facility] comes in with headache and cough and fever.  Patient has pronounced laryngitis.  He has a very high-pitched cough.  Here his primary complaint is the headache.    Vital signs 184/79 respiratory rate 18 heart rate 69 temperature 97.9 O2 sat 100  Lungs are clear without wheeze he has no stridor patient is able to ambulate and communicate although he has a very hoarse voice.Cough is completely nonproductive    Patient to get DuoNebs chest film labs and swab.  Likely viral although given the travel will be isolated until x-rays done    I performed a history and physical exam of the patient and discussed their management with the resident and /or advanced care provider. I personally made/approved the management plan and take responsibility for the patient management. I reviewed the resident and /or ACP's note and agree with the documented findings and plan of care. My medical decison making and observations are found above.

## 2024-03-21 NOTE — ED PROVIDER NOTE - NSICDXPASTMEDICALHX_GEN_ALL_CORE_FT
PAST MEDICAL HISTORY:  CAD (coronary artery disease)     Chest pain     Essential hypertension     Gastritis, presence of bleeding unspecified, unspecified chronicity, unspecified gastritis type     GERD (gastroesophageal reflux disease)     Hypercholesterolemia     Liver abscess 1981    Old MI (myocardial infarction) 1999    S/P CABG x 3 1999, [facility]

## 2024-03-21 NOTE — ED PROVIDER NOTE - OBJECTIVE STATEMENT
69  year old male with HTN, hyperlipidemia, known CAD with CABG and subsequent PTCA/stent, liver abscess, presents for three days of hemoptysis, sore throat, headache. recently got back from [facility] 3/18 thinks cough was worsened by pollution. has no history of asthma/copd, no prior nebulizer use. endorses severe head pain. has not taken ibuprofen or tylenol in 2 days. also has sore throat and hoarse voice, and low grade fever. denies chest pain, n/v, abdominal pain, diarrhea, leg swelling. pt tolerating secretions talking in full sentance. on arrival 184/70 RR 18 Hr 69 afberile 99% on RA.

## 2024-03-21 NOTE — ED PROVIDER NOTE - PHYSICAL EXAMINATION
GENERAL: hoarse voice, appears uncomfortalbe  HEAD: normocephalic, atraumatic  CARDIAC: regular rate and rhythm, normal S1S2, no appreciable murmurs, 2+ pulses in UE/LE b/l  PULM: rhonchi, mild expiratory wheezing, worse on left  GI: abdomen nondistended, soft, nontender, no guarding, rebound tenderness  MSK: no peripheral edema, no calf tenderness b/l  SKIN: well-perfused, extremities warm, no visible rashes  PSYCH: appropriate mood and affect

## 2024-03-22 LAB
BASOPHILS # BLD AUTO: 0.1 K/UL — SIGNIFICANT CHANGE UP (ref 0–0.2)
BASOPHILS NFR BLD AUTO: 0.9 % — SIGNIFICANT CHANGE UP (ref 0–2)
BURR CELLS BLD QL SMEAR: PRESENT — SIGNIFICANT CHANGE UP
EOSINOPHIL # BLD AUTO: 0.1 K/UL — SIGNIFICANT CHANGE UP (ref 0–0.5)
EOSINOPHIL NFR BLD AUTO: 0.9 % — SIGNIFICANT CHANGE UP (ref 0–6)
FLUAV AG NPH QL: SIGNIFICANT CHANGE UP
FLUBV AG NPH QL: SIGNIFICANT CHANGE UP
GIANT PLATELETS BLD QL SMEAR: PRESENT — SIGNIFICANT CHANGE UP
LYMPHOCYTES # BLD AUTO: 1.51 K/UL — SIGNIFICANT CHANGE UP (ref 1–3.3)
LYMPHOCYTES # BLD AUTO: 13 % — SIGNIFICANT CHANGE UP (ref 13–44)
MONOCYTES # BLD AUTO: 1.91 K/UL — HIGH (ref 0–0.9)
MONOCYTES NFR BLD AUTO: 16.5 % — HIGH (ref 2–14)
NEUTROPHILS # BLD AUTO: 7.86 K/UL — HIGH (ref 1.8–7.4)
NEUTROPHILS NFR BLD AUTO: 66.9 % — SIGNIFICANT CHANGE UP (ref 43–77)
NEUTS BAND # BLD: 0.9 % — SIGNIFICANT CHANGE UP (ref 0–6)
PLAT MORPH BLD: NORMAL — SIGNIFICANT CHANGE UP
PLATELET COUNT - ESTIMATE: NORMAL — SIGNIFICANT CHANGE UP
POIKILOCYTOSIS BLD QL AUTO: SLIGHT — SIGNIFICANT CHANGE UP
POLYCHROMASIA BLD QL SMEAR: SLIGHT — SIGNIFICANT CHANGE UP
RBC BLD AUTO: NORMAL — SIGNIFICANT CHANGE UP
RSV RNA NPH QL NAA+NON-PROBE: SIGNIFICANT CHANGE UP
SARS-COV-2 RNA SPEC QL NAA+PROBE: SIGNIFICANT CHANGE UP
VARIANT LYMPHS # BLD: 0.9 % — SIGNIFICANT CHANGE UP (ref 0–6)

## 2024-03-22 PROCEDURE — 71045 X-RAY EXAM CHEST 1 VIEW: CPT | Mod: 26

## 2024-03-22 RX ORDER — AZITHROMYCIN 500 MG/1
1 TABLET, FILM COATED ORAL
Qty: 3 | Refills: 0
Start: 2024-03-22 | End: 2024-03-24

## 2024-03-22 RX ORDER — AZITHROMYCIN 500 MG/1
1 TABLET, FILM COATED ORAL
Qty: 14 | Refills: 0
Start: 2024-03-22 | End: 2024-03-28

## 2024-03-22 RX ORDER — CEFTRIAXONE 500 MG/1
1000 INJECTION, POWDER, FOR SOLUTION INTRAMUSCULAR; INTRAVENOUS ONCE
Refills: 0 | Status: COMPLETED | OUTPATIENT
Start: 2024-03-22 | End: 2024-03-22

## 2024-03-22 RX ADMIN — CEFTRIAXONE 100 MILLIGRAM(S): 500 INJECTION, POWDER, FOR SOLUTION INTRAMUSCULAR; INTRAVENOUS at 01:53

## 2024-03-22 NOTE — ED ADULT NURSE REASSESSMENT NOTE - NS ED NURSE REASSESS COMMENT FT1
patient resting comfortably in stretcher, breathing even and unlabored. Offers no complaints at this time. Pending discharge

## 2024-11-25 NOTE — H&P PST ADULT - NS MD HP PULSE POSTERIOR
----- Message from MYA Sandoval sent at 11/25/2024  2:03 PM CST -----  Patient scheduled for a clinic visit with PA Scheuermann on today.;  He states that he had to leave because his job.  The health dept had arrived at the facility so it was urgent that he be there.  Patient rescheduled to see PA Scheuermann on 12/30/24; reminder notice mailed.  
left normal/right normal

## 2024-12-16 NOTE — ED PROVIDER NOTE - OBJECTIVE STATEMENT
12/18/2024       Zoë Saldaña   [place] [address]         TEST RESULT NOTIFICATION    Dear Zoë,    We have been unable to contact you via the telephone regarding your recent test(s) performed at the clinic.     At your earliest convenience, please call the office at [phone] to receive Bone Density Results.     Thank you,      Nba Villareal MD  [address] [place]  [address] [phone]  
63 y/o male c/o rash x10 days. Pt admits to itchy, painful rash. Pt admits to developing multiple small "pimples" to buttock about 2-3 days into symptoms. Rash is now improving but pt still c/o pain and pruritis to buttock. Pt denies chest pain, sob, abd pain, n/v/d, numbness, tingling, weakness, dizziness, syncope, fever or chills.